# Patient Record
Sex: FEMALE | Race: WHITE | NOT HISPANIC OR LATINO | Employment: UNEMPLOYED | URBAN - NONMETROPOLITAN AREA
[De-identification: names, ages, dates, MRNs, and addresses within clinical notes are randomized per-mention and may not be internally consistent; named-entity substitution may affect disease eponyms.]

---

## 2022-02-02 ENCOUNTER — OFFICE VISIT (OUTPATIENT)
Dept: FAMILY MEDICINE CLINIC | Facility: CLINIC | Age: 22
End: 2022-02-02

## 2022-02-02 VITALS
HEIGHT: 62 IN | SYSTOLIC BLOOD PRESSURE: 118 MMHG | HEART RATE: 82 BPM | OXYGEN SATURATION: 98 % | DIASTOLIC BLOOD PRESSURE: 82 MMHG | BODY MASS INDEX: 21.16 KG/M2 | TEMPERATURE: 98.2 F | RESPIRATION RATE: 18 BRPM | WEIGHT: 115 LBS

## 2022-02-02 DIAGNOSIS — R53.83 FATIGUE, UNSPECIFIED TYPE: ICD-10-CM

## 2022-02-02 DIAGNOSIS — N94.6 DYSMENORRHEA: ICD-10-CM

## 2022-02-02 DIAGNOSIS — R19.7 DIARRHEA, UNSPECIFIED TYPE: ICD-10-CM

## 2022-02-02 DIAGNOSIS — Z76.89 ENCOUNTER TO ESTABLISH CARE: ICD-10-CM

## 2022-02-02 DIAGNOSIS — Z00.00 ROUTINE MEDICAL EXAM: Primary | ICD-10-CM

## 2022-02-02 DIAGNOSIS — Z00.00 ROUTINE MEDICAL EXAM: ICD-10-CM

## 2022-02-02 LAB
ALBUMIN SERPL-MCNC: 4.52 G/DL (ref 3.5–5.2)
ALBUMIN/GLOB SERPL: 1.5 G/DL
ALP SERPL-CCNC: 58 U/L (ref 39–117)
ALT SERPL W P-5'-P-CCNC: 11 U/L (ref 1–33)
ANION GAP SERPL CALCULATED.3IONS-SCNC: 11.3 MMOL/L (ref 5–15)
AST SERPL-CCNC: 19 U/L (ref 1–32)
BASOPHILS # BLD AUTO: 0.02 10*3/MM3 (ref 0–0.2)
BASOPHILS NFR BLD AUTO: 0.5 % (ref 0–1.5)
BILIRUB SERPL-MCNC: 0.3 MG/DL (ref 0–1.2)
BUN SERPL-MCNC: 6 MG/DL (ref 6–20)
BUN/CREAT SERPL: 8.8 (ref 7–25)
CALCIUM SPEC-SCNC: 9.6 MG/DL (ref 8.6–10.5)
CHLORIDE SERPL-SCNC: 103 MMOL/L (ref 98–107)
CHOLEST SERPL-MCNC: 139 MG/DL (ref 0–200)
CO2 SERPL-SCNC: 25.7 MMOL/L (ref 22–29)
CREAT SERPL-MCNC: 0.68 MG/DL (ref 0.57–1)
DEPRECATED RDW RBC AUTO: 46.3 FL (ref 37–54)
EOSINOPHIL # BLD AUTO: 0 10*3/MM3 (ref 0–0.4)
EOSINOPHIL NFR BLD AUTO: 0 % (ref 0.3–6.2)
ERYTHROCYTE [DISTWIDTH] IN BLOOD BY AUTOMATED COUNT: 12.9 % (ref 12.3–15.4)
GFR SERPL CREATININE-BSD FRML MDRD: 110 ML/MIN/1.73
GLOBULIN UR ELPH-MCNC: 3.1 GM/DL
GLUCOSE SERPL-MCNC: 82 MG/DL (ref 65–99)
HCT VFR BLD AUTO: 35.6 % (ref 34–46.6)
HCV AB SER DONR QL: NORMAL
HDLC SERPL-MCNC: 59 MG/DL (ref 40–60)
HGB BLD-MCNC: 11.9 G/DL (ref 12–15.9)
IMM GRANULOCYTES # BLD AUTO: 0.01 10*3/MM3 (ref 0–0.05)
IMM GRANULOCYTES NFR BLD AUTO: 0.2 % (ref 0–0.5)
LDLC SERPL CALC-MCNC: 63 MG/DL (ref 0–100)
LDLC/HDLC SERPL: 1.05 {RATIO}
LYMPHOCYTES # BLD AUTO: 1.52 10*3/MM3 (ref 0.7–3.1)
LYMPHOCYTES NFR BLD AUTO: 37.3 % (ref 19.6–45.3)
MCH RBC QN AUTO: 32.4 PG (ref 26.6–33)
MCHC RBC AUTO-ENTMCNC: 33.4 G/DL (ref 31.5–35.7)
MCV RBC AUTO: 97 FL (ref 79–97)
MONOCYTES # BLD AUTO: 0.7 10*3/MM3 (ref 0.1–0.9)
MONOCYTES NFR BLD AUTO: 17.2 % (ref 5–12)
NEUTROPHILS NFR BLD AUTO: 1.82 10*3/MM3 (ref 1.7–7)
NEUTROPHILS NFR BLD AUTO: 44.8 % (ref 42.7–76)
NRBC BLD AUTO-RTO: 0 /100 WBC (ref 0–0.2)
PLATELET # BLD AUTO: 186 10*3/MM3 (ref 140–450)
PMV BLD AUTO: 10.9 FL (ref 6–12)
POTASSIUM SERPL-SCNC: 3.8 MMOL/L (ref 3.5–5.2)
PROT SERPL-MCNC: 7.6 G/DL (ref 6–8.5)
RBC # BLD AUTO: 3.67 10*6/MM3 (ref 3.77–5.28)
SODIUM SERPL-SCNC: 140 MMOL/L (ref 136–145)
TRIGL SERPL-MCNC: 90 MG/DL (ref 0–150)
TSH SERPL DL<=0.05 MIU/L-ACNC: 0.74 UIU/ML (ref 0.27–4.2)
VLDLC SERPL-MCNC: 17 MG/DL (ref 5–40)
WBC NRBC COR # BLD: 4.07 10*3/MM3 (ref 3.4–10.8)

## 2022-02-02 PROCEDURE — 99203 OFFICE O/P NEW LOW 30 MIN: CPT | Performed by: NURSE PRACTITIONER

## 2022-02-02 PROCEDURE — 80061 LIPID PANEL: CPT | Performed by: NURSE PRACTITIONER

## 2022-02-02 PROCEDURE — 86803 HEPATITIS C AB TEST: CPT | Performed by: NURSE PRACTITIONER

## 2022-02-02 PROCEDURE — 82306 VITAMIN D 25 HYDROXY: CPT | Performed by: NURSE PRACTITIONER

## 2022-02-02 PROCEDURE — 82607 VITAMIN B-12: CPT | Performed by: NURSE PRACTITIONER

## 2022-02-02 PROCEDURE — 80050 GENERAL HEALTH PANEL: CPT | Performed by: NURSE PRACTITIONER

## 2022-02-02 RX ORDER — HYDROCODONE BITARTRATE AND ACETAMINOPHEN 5; 325 MG/1; MG/1
1 TABLET ORAL EVERY 6 HOURS PRN
COMMUNITY
End: 2022-06-30

## 2022-02-02 RX ORDER — AMOXICILLIN 500 MG/1
500 CAPSULE ORAL 3 TIMES DAILY
COMMUNITY
End: 2022-06-30

## 2022-02-02 NOTE — PATIENT INSTRUCTIONS
Dysmenorrhea    Dysmenorrhea refers to cramps caused by the muscles of the uterus tightening (bala) during a menstrual period. Dysmenorrhea may be mild, or it may be severe enough to interfere with everyday activities for a few days each month.  Primary dysmenorrhea is menstrual cramps that last a couple of days when you start having menstrual periods or soon after. This often begins after a teenager starts having her period. As a woman gets older or has a baby, the cramps will usually lessen or disappear. Secondary dysmenorrhea begins later in life and is caused by a disorder in the reproductive system. It lasts longer, and it may cause more pain than primary dysmenorrhea. The pain may start before the period and last a few days after the period.  What are the causes?  Dysmenorrhea is usually caused by an underlying problem, such as:  · The tissue that lines the uterus (endometrium) growing outside of the uterus in other areas of the body (endometriosis).  · Endometrial tissue growing into the muscular walls of the uterus (adenomyosis).  · Blood vessels in the pelvis becoming filled with blood just before the menstrual period (pelvic congestive syndrome).  · Overgrowth of cells (polyps) in the endometrium or the lower part of the uterus (cervix).  · The uterus dropping down into the vagina (prolapse) due to stretched or weak muscles.  · Bladder problems, such as infection or inflammation.  · Intestinal problems, such as a tumor or irritable bowel syndrome.  · Cancer of the reproductive organs or bladder.  · A severely tipped uterus.  · A cervix that is closed or has a very small opening.  · Noncancerous (benign) tumors of the uterus (fibroids).  · Pelvic inflammatory disease (PID).  · Pelvic scarring (adhesions) from a previous surgery.  · An ovarian cyst.  · An IUD (intrauterine device).  What increases the risk?  You are more likely to develop this condition if:  · You are younger than age 30.  · You  started puberty early.  · You have irregular or heavy bleeding.  · You have never given birth.  · You have a family history of dysmenorrhea.  · You smoke.  What are the signs or symptoms?  Symptoms of this condition include:  · Cramping, throbbing pain, or a feeling of fullness in the lower abdomen.  · Lower back pain.  · Periods lasting for longer than 7 days.  · Headaches.  · Bloating.  · Fatigue.  · Nausea or vomiting.  · Diarrhea.  · Sweating or dizziness.  · Loose stools.  How is this diagnosed?  This condition may be diagnosed based on:  · Your symptoms.  · Your medical history.  · A physical exam.  · Blood tests.  · A Pap test. This is a test in which cells from the cervix are tested for signs of cancer or infection.  · A pregnancy test.  · Imaging tests, such as:  ? Ultrasound.  ? A procedure to remove and examine a sample of endometrial tissue (dilation and curettage, D&C).  ? A procedure to visually examine the inside of:  § The uterus (hysteroscopy).  § The abdomen or pelvis (laparoscopy).  § The bladder (cystoscopy).  § The intestine (colonoscopy).  § The stomach (gastroscopy).  ? X-rays.  ? CT scan.  ? MRI.  How is this treated?  Treatment depends on the cause of the dysmenorrhea. Treatment may include:  · Pain medicine prescribed by your health care provider.  · Birth control pills that contain the hormone progesterone.  · An IUD that contains the hormone progesterone.  · Medicines to control bleeding.  · Hormone replacement therapy.  · NSAIDs. These may help to stop the production of hormones that cause cramps.  · Antidepressant medicines.  · Surgery to remove adhesions, endometriosis, ovarian cysts, fibroids, or the entire uterus (hysterectomy).  · Injections of progesterone to stop the menstrual period.  · A procedure to destroy the endometrium (endometrial ablation).  · A procedure to cut the nerves in the bottom of the spine (sacrum) that go to the reproductive organs (presacral neurectomy).  · A  procedure to apply an electric current to nerves in the sacrum (sacral nerve stimulation).  · Exercise and physical therapy.  · Meditation and yoga therapy.  · Acupuncture.  Work with your health care provider to determine what treatment or combination of treatments is best for you.  Follow these instructions at home:  Relieving pain and cramping  · Apply heat to your lower back or abdomen when you experience pain or cramps. Use the heat source that your health care provider recommends, such as a moist heat pack or a heating pad.  ? Place a towel between your skin and the heat source.  ? Leave the heat on for 20-30 minutes.  ? Remove the heat if your skin turns bright red. This is especially important if you are unable to feel pain, heat, or cold. You may have a greater risk of getting burned.  ? Do not sleep with a heating pad on.  · Do aerobic exercises, such as walking, swimming, or biking. This can help to relieve cramps.  · Massage your lower back or abdomen to help relieve pain.  General instructions  · Take over-the-counter and prescription medicines only as told by your health care provider.  · Do not drive or use heavy machinery while taking prescription pain medicine.  · Avoid alcohol and caffeine during and right before your menstrual period. These can make cramps worse.  · Do not use any products that contain nicotine or tobacco, such as cigarettes and e-cigarettes. If you need help quitting, ask your health care provider.  · Keep all follow-up visits as told by your health care provider. This is important.  Contact a health care provider if:  · You have pain that gets worse or does not get better with medicine.  · You have pain with sex.  · You develop nausea or vomiting with your period that is not controlled with medicine.  Get help right away if:  · You faint.  Summary  · Dysmenorrhea refers to cramps caused by the muscles of the uterus tightening (bala) during a menstrual  period.  · Dysmenorrhea may be mild, or it may be severe enough to interfere with everyday activities for a few days each month.  · Treatment depends on the cause of the dysmenorrhea.  · Work with your health care provider to determine what treatment or combination of treatments is best for you.  This information is not intended to replace advice given to you by your health care provider. Make sure you discuss any questions you have with your health care provider.  Document Revised: 11/30/2018 Document Reviewed: 01/20/2018  [employer] Patient Education © 2021 [employer]    Fatigue  If you have fatigue, you feel tired all the time and have a lack of energy or a lack of motivation. Fatigue may make it difficult to start or complete tasks because of exhaustion. In general, occasional or mild fatigue is often a normal response to activity or life. However, long-lasting (chronic) or extreme fatigue may be a symptom of a medical condition.  Follow these instructions at home:  General instructions  · Watch your fatigue for any changes.  · Go to bed and get up at the same time every day.  · Avoid fatigue by pacing yourself during the day and getting enough sleep at night.  · Maintain a healthy weight.  Medicines  · Take over-the-counter and prescription medicines only as told by your health care provider.  · Take a multivitamin, if told by your health care provider.   · Do not use herbal or dietary supplements unless they are approved by your health care provider.  Activity    · Exercise regularly, as told by your health care provider.  · Use or practice techniques to help you relax, such as yoga, mary chi, meditation, or massage therapy.    Eating and drinking    · Avoid heavy meals in the evening.  · Eat a well-balanced diet, which includes lean proteins, whole grains, plenty of fruits and vegetables, and low-fat dairy products.  · Avoid consuming too much caffeine.  · Avoid the use of alcohol.  · Drink enough fluid to  keep your urine pale yellow.    Lifestyle  · Change situations that cause you stress. Try to keep your work and personal schedule in balance.  · Do not use any products that contain nicotine or tobacco, such as cigarettes and e-cigarettes. If you need help quitting, ask your health care provider.  · Do not use drugs.  Contact a health care provider if:  · Your fatigue does not get better.  · You have a fever.  · You suddenly lose or gain weight.  · You have headaches.  · You have trouble falling asleep or sleeping through the night.  · You feel angry, guilty, anxious, or sad.  · You are unable to have a bowel movement (constipation).  · Your skin is dry.  · You have swelling in your legs or another part of your body.  Get help right away if:  · You feel confused.  · Your vision is blurry.  · You feel faint or you pass out.  · You have a severe headache.  · You have severe pain in your abdomen, your back, or the area between your waist and hips (pelvis).  · You have chest pain, shortness of breath, or an irregular or fast heartbeat.  · You are unable to urinate, or you urinate less than normal.  · You have abnormal bleeding, such as bleeding from the rectum, vagina, nose, lungs, or nipples.  · You vomit blood.  · You have thoughts about hurting yourself or others.  If you ever feel like you may hurt yourself or others, or have thoughts about taking your own life, get help right away. You can go to your nearest emergency department or call:  · Your local emergency services (911 in the [place]  · A suicide crisis helpline, such as the National Suicide Prevention Lifeline at 1-911.129.4434. This is open 24 hours a day.  Summary  · If you have fatigue, you feel tired all the time and have a lack of energy or a lack of motivation.  · Fatigue may make it difficult to start or complete tasks because of exhaustion.  · Long-lasting (chronic) or extreme fatigue may be a symptom of a medical condition.  · Exercise regularly, as  told by your health care provider.  · Change situations that cause you stress. Try to keep your work and personal schedule in balance.  This information is not intended to replace advice given to you by your health care provider. Make sure you discuss any questions you have with your health care provider.  Document Revised: 07/08/2020 Document Reviewed: 09/12/2018  [employer] Patient Education © 2021 [employer]

## 2022-02-02 NOTE — PROGRESS NOTES
"Chief Complaint  Establish Care    Subjective          Chanda Eden presents to [facility] to establish care (annual physical).    Fatigue  This is a recurrent problem. Episode onset: approximately one year ago. The problem occurs daily. The problem has been gradually worsening. Associated symptoms include abdominal pain, a change in bowel habit, chills, fatigue, headaches, nausea and swollen glands. Pertinent negatives include no anorexia, arthralgias, chest pain, congestion, coughing, diaphoresis, fever, joint swelling, myalgias, neck pain, numbness, rash, sore throat, urinary symptoms, vertigo, visual change, vomiting or weakness. Nothing aggravates the symptoms. Treatments tried: caffeine. The treatment provided mild relief.   Diarrhea   This is a recurrent problem. Episode onset: approximately 10 months ago. The problem occurs 2 to 4 times per day. The problem has been unchanged. The stool consistency is described as watery. The patient states that diarrhea does not awaken her from sleep. Associated symptoms include abdominal pain, bloating, chills and headaches. Pertinent negatives include no arthralgias, coughing, fever, increased  flatus, myalgias, sweats, URI, vomiting or weight loss. Exacerbated by: \"chicken\" per pt. She has tried increased fluids for the symptoms.     Objective   Vital Signs:   /82   Pulse 82   Temp 98.2 °F (36.8 °C) (Temporal)   Resp 18   Ht 157.5 cm (62\")   Wt 52.2 kg (115 lb)   SpO2 98%   BMI 21.03 kg/m²     Physical Exam  Vitals and nursing note reviewed.   Constitutional:       General: She is awake.      Appearance: Normal appearance.   HENT:      Head: Normocephalic.      Right Ear: Hearing and external ear normal. There is impacted cerumen.      Left Ear: Hearing and external ear normal. There is impacted cerumen.      Nose: Nose normal.      Mouth/Throat:      Lips: Pink.      Mouth: Mucous membranes are moist.      Pharynx: " Pharyngeal swelling and posterior oropharyngeal erythema present.   Eyes:      General: Lids are normal.      Extraocular Movements: Extraocular movements intact.      Conjunctiva/sclera: Conjunctivae normal.      Pupils: Pupils are equal, round, and reactive to light.   Cardiovascular:      Rate and Rhythm: Normal rate and regular rhythm.      Pulses: Normal pulses.      Heart sounds: Normal heart sounds.   Pulmonary:      Effort: Pulmonary effort is normal.      Breath sounds: Normal breath sounds.   Abdominal:      General: Abdomen is flat. Bowel sounds are normal.      Palpations: Abdomen is soft.      Tenderness: There is no abdominal tenderness.   Musculoskeletal:         General: Normal range of motion.      Cervical back: Normal range of motion and neck supple.      Right lower leg: No edema.      Left lower leg: No edema.   Lymphadenopathy:      Head:      Right side of head: Preauricular adenopathy present.      Cervical: Cervical adenopathy present.      Right cervical: Superficial cervical adenopathy present.      Left cervical: Superficial cervical adenopathy present.   Skin:     General: Skin is warm and dry.      Capillary Refill: Capillary refill takes less than 2 seconds.   Neurological:      Mental Status: She is alert and oriented to person, place, and time.      Cranial Nerves: Cranial nerves are intact.      Sensory: Sensation is intact.      Motor: Motor function is intact.      Coordination: Coordination is intact.      Gait: Gait is intact.   Psychiatric:         Attention and Perception: Attention normal.         Mood and Affect: Mood and affect normal.         Speech: Speech normal.         Behavior: Behavior normal. Behavior is cooperative.         Thought Content: Thought content normal.         Cognition and Memory: Cognition normal.         Judgment: Judgment normal.        Result Review :   The following data was reviewed by: KAYLAH Delgado on 02/02/2022:              Assessment  and Plan    Diagnoses and all orders for this visit:    1. Routine medical exam (Primary)  -     CBC w AUTO Differential; Future  -     Comprehensive metabolic panel; Future  -     Lipid panel; Future  -     TSH; Future  -     Hepatitis C antibody; Future    2. Fatigue, unspecified type  -     TSH; Future  -     Vitamin D 25 hydroxy; Future  -     Vitamin B12; Future    3. Dysmenorrhea  -     CBC w AUTO Differential; Future  -     Ambulatory Referral to Obstetrics / Gynecology    4. Diarrhea, unspecified type  -     Ambulatory Referral to Gastroenterology    5. Encounter to establish care    I spent 30 minutes caring for Chanda on this date of service. This time includes time spent by me in the following activities:preparing for the visit, reviewing tests, obtaining and/or reviewing a separately obtained history, performing a medically appropriate examination and/or evaluation , counseling and educating the patient/family/caregiver, ordering medications, tests, or procedures, referring and communicating with other health care professionals , documenting information in the medical record, independently interpreting results and communicating that information with the patient/family/caregiver and care coordination  Follow Up   Return in about 6 months (around 8/2/2022).  Patient was given instructions and counseling regarding her condition or for health maintenance advice. Please see specific information pulled into the AVS if appropriate.       This document has been electronically signed by KAYLAH Delgado  February 2, 2022 12:44 EST

## 2022-02-03 ENCOUNTER — OFFICE VISIT (OUTPATIENT)
Dept: FAMILY MEDICINE CLINIC | Facility: CLINIC | Age: 22
End: 2022-02-03

## 2022-02-03 ENCOUNTER — TELEPHONE (OUTPATIENT)
Dept: PEDIATRICS | Facility: OTHER | Age: 22
End: 2022-02-03

## 2022-02-03 VITALS
SYSTOLIC BLOOD PRESSURE: 116 MMHG | BODY MASS INDEX: 21.16 KG/M2 | HEART RATE: 87 BPM | WEIGHT: 115 LBS | OXYGEN SATURATION: 99 % | RESPIRATION RATE: 18 BRPM | HEIGHT: 62 IN | DIASTOLIC BLOOD PRESSURE: 71 MMHG | TEMPERATURE: 97.6 F

## 2022-02-03 DIAGNOSIS — H60.502 ACUTE OTITIS EXTERNA OF LEFT EAR, UNSPECIFIED TYPE: Primary | ICD-10-CM

## 2022-02-03 DIAGNOSIS — E55.9 VITAMIN D DEFICIENCY: ICD-10-CM

## 2022-02-03 LAB
25(OH)D3 SERPL-MCNC: 15.1 NG/ML (ref 30–100)
VIT B12 BLD-MCNC: 764 PG/ML (ref 211–946)

## 2022-02-03 PROCEDURE — 99213 OFFICE O/P EST LOW 20 MIN: CPT | Performed by: NURSE PRACTITIONER

## 2022-02-03 RX ORDER — ERGOCALCIFEROL 1.25 MG/1
50000 CAPSULE ORAL WEEKLY
Qty: 8 CAPSULE | Refills: 0 | Status: SHIPPED | OUTPATIENT
Start: 2022-02-03 | End: 2022-08-26

## 2022-02-03 RX ORDER — OFLOXACIN 3 MG/ML
5 SOLUTION AURICULAR (OTIC) DAILY
Qty: 5 ML | Refills: 0 | Status: SHIPPED | OUTPATIENT
Start: 2022-02-03 | End: 2022-06-30

## 2022-02-03 NOTE — PROGRESS NOTES
"Chief Complaint  Ear Fullness (on the left side) and Potential Dry Socket.    Subjective          Chanda Eden presents to [facility] for acute care ear congestion (left).    Ear Fullness   There is pain in the left ear. This is a new problem. The current episode started yesterday (Pt had cerumen removal via irrigation yesterday). The problem occurs constantly. The problem has been unchanged. There has been no fever. The patient is experiencing no pain. Pertinent negatives include no abdominal pain, coughing, diarrhea, ear discharge, headaches, hearing loss, neck pain, rash, rhinorrhea, sore throat or vomiting. She has tried nothing for the symptoms.     Objective   Vital Signs:   /71   Pulse 87   Temp 97.6 °F (36.4 °C) (Temporal)   Resp 18   Ht 157.5 cm (62\")   Wt 52.2 kg (115 lb)   SpO2 99%   BMI 21.03 kg/m²     Physical Exam  Vitals and nursing note reviewed.   Constitutional:       General: She is awake.      Appearance: Normal appearance.   HENT:      Head: Normocephalic.      Right Ear: Hearing, tympanic membrane, ear canal and external ear normal.      Left Ear: Hearing, tympanic membrane and external ear normal. Swelling and tenderness present.      Nose: Nose normal.      Mouth/Throat:      Lips: Pink.      Mouth: Mucous membranes are moist.      Pharynx: Oropharynx is clear.   Eyes:      General: Lids are normal.      Conjunctiva/sclera: Conjunctivae normal.      Pupils: Pupils are equal, round, and reactive to light.   Cardiovascular:      Rate and Rhythm: Normal rate and regular rhythm.      Heart sounds: Normal heart sounds.   Pulmonary:      Effort: Pulmonary effort is normal.      Breath sounds: Normal breath sounds.   Abdominal:      General: Abdomen is protuberant. Bowel sounds are normal.      Palpations: Abdomen is soft.      Tenderness: There is no abdominal tenderness.   Musculoskeletal:         General: Normal range of motion.      Cervical back: " Normal range of motion.   Skin:     General: Skin is warm and dry.      Capillary Refill: Capillary refill takes less than 2 seconds.   Neurological:      Mental Status: She is alert and oriented to person, place, and time.      Sensory: Sensation is intact.      Motor: Motor function is intact.      Coordination: Coordination is intact.      Gait: Gait is intact.   Psychiatric:         Attention and Perception: Attention normal.         Mood and Affect: Mood and affect normal.         Speech: Speech normal.         Behavior: Behavior normal. Behavior is cooperative.         Thought Content: Thought content normal.         Cognition and Memory: Cognition normal.         Judgment: Judgment normal.        Result Review :   The following data was reviewed by: KAYLAH Delgado on 02/03/2022:  CMP    CMP 2/2/22   Glucose 82   BUN 6   Creatinine 0.68   eGFR Non African Am 110   Sodium 140   Potassium 3.8   Chloride 103   Calcium 9.6   Albumin 4.52   Total Bilirubin 0.3   Alkaline Phosphatase 58   AST (SGOT) 19   ALT (SGPT) 11           CBC w/diff    CBC w/Diff 2/2/22   WBC 4.07   RBC 3.67 (A)   Hemoglobin 11.9 (A)   Hematocrit 35.6   MCV 97.0   MCH 32.4   MCHC 33.4   RDW 12.9   Platelets 186   Neutrophil Rel % 44.8   Immature Granulocyte Rel % 0.2   Lymphocyte Rel % 37.3   Monocyte Rel % 17.2 (A)   Eosinophil Rel % 0.0 (A)   Basophil Rel % 0.5   (A) Abnormal value            Lipid Panel    Lipid Panel 2/2/22   Total Cholesterol 139   Triglycerides 90   HDL Cholesterol 59   VLDL Cholesterol 17   LDL Cholesterol  63   LDL/HDL Ratio 1.05           TSH    TSH 2/2/22   TSH 0.741           Assessment and Plan    Diagnoses and all orders for this visit:    1. Acute otitis externa of left ear, unspecified type (Primary)  -     ofloxacin (FLOXIN) 0.3 % otic solution; Administer 5 drops into the left ear Daily.  Dispense: 5 mL; Refill: 0    2. Vitamin D deficiency  -     vitamin D (ERGOCALCIFEROL) 1.25 MG (29144 UT) capsule  capsule; Take 1 capsule by mouth 1 (One) Time Per Week.  Dispense: 8 capsule; Refill: 0    I spent 20 minutes caring for Chanda on this date of service. This time includes time spent by me in the following activities:preparing for the visit, reviewing tests, obtaining and/or reviewing a separately obtained history, performing a medically appropriate examination and/or evaluation , counseling and educating the patient/family/caregiver, ordering medications, tests, or procedures, documenting information in the medical record and independently interpreting results and communicating that information with the patient/family/caregiver  Follow Up   Return if symptoms worsen or fail to improve.  Patient was given instructions and counseling regarding her condition or for health maintenance advice. Please see specific information pulled into the AVS if appropriate.       This document has been electronically signed by KAYLAH Delgado  February 3, 2022 13:53 EST

## 2022-02-03 NOTE — PATIENT INSTRUCTIONS
Otitis Externa    Otitis externa is an infection of the outer ear canal. The outer ear canal is the area between the outside of the ear and the eardrum. Otitis externa is sometimes called swimmer's ear.  What are the causes?  Common causes of this condition include:  · Swimming in dirty water.  · Moisture in the ear.  · An injury to the inside of the ear.  · An object stuck in the ear.  · A cut or scrape on the outside of the ear.  What increases the risk?  You are more likely to develop this condition if you go swimming often.  What are the signs or symptoms?  The first symptom of this condition is often itching in the ear. Later symptoms of the condition include:  · Swelling of the ear.  · Redness in the ear.  · Ear pain. The pain may get worse when you pull on your ear.  · Pus coming from the ear.  How is this diagnosed?  This condition may be diagnosed by examining the ear and testing fluid from the ear for bacteria and funguses.  How is this treated?  This condition may be treated with:  · Antibiotic ear drops. These are often given for 10-14 days.  · Medicines to reduce itching and swelling.  Follow these instructions at home:  · If you were prescribed antibiotic ear drops, use them as told by your health care provider. Do not stop using the antibiotic even if your condition improves.  · Take over-the-counter and prescription medicines only as told by your health care provider.  · Avoid getting water in your ears as told by your health care provider. This may include avoiding swimming or water sports for a few days.  · Keep all follow-up visits as told by your health care provider. This is important.  How is this prevented?  · Keep your ears dry. Use the corner of a towel to dry your ears after you swim or bathe.  · Avoid scratching or putting things in your ear. Doing these things can damage the ear canal or remove the protective wax that lines it, which makes it easier for bacteria and funguses to  grow.  · Avoid swimming in lakes, polluted water, or pools that may not have enough chlorine.  Contact a health care provider if:  · You have a fever.  · Your ear is still red, swollen, painful, or draining pus after 3 days.  · Your redness, swelling, or pain gets worse.  · You have a severe headache.  · You have redness, swelling, pain, or tenderness in the area behind your ear.  Summary  · Otitis externa is an infection of the outer ear canal.  · Common causes include swimming in dirty water, moisture in the ear, or a cut or scrape in the ear.  · Symptoms include pain, redness, and swelling of the ear.  · If you were prescribed antibiotic ear drops, use them as told by your health care provider. Do not stop using the antibiotic even if your condition improves.  This information is not intended to replace advice given to you by your health care provider. Make sure you discuss any questions you have with your health care provider.  Document Revised: 05/24/2019 Document Reviewed: 05/24/2019  [employer] Patient Education © 2021 [employer]

## 2022-06-30 ENCOUNTER — OFFICE VISIT (OUTPATIENT)
Dept: FAMILY MEDICINE CLINIC | Facility: CLINIC | Age: 22
End: 2022-06-30

## 2022-06-30 VITALS
RESPIRATION RATE: 18 BRPM | HEIGHT: 62 IN | SYSTOLIC BLOOD PRESSURE: 121 MMHG | BODY MASS INDEX: 20.02 KG/M2 | WEIGHT: 108.8 LBS | HEART RATE: 88 BPM | OXYGEN SATURATION: 99 % | TEMPERATURE: 98.2 F | DIASTOLIC BLOOD PRESSURE: 81 MMHG

## 2022-06-30 DIAGNOSIS — E55.9 VITAMIN D DEFICIENCY: ICD-10-CM

## 2022-06-30 DIAGNOSIS — Z72.51 HIGH RISK SEXUAL BEHAVIOR, UNSPECIFIED TYPE: ICD-10-CM

## 2022-06-30 DIAGNOSIS — R53.83 FATIGUE, UNSPECIFIED TYPE: ICD-10-CM

## 2022-06-30 DIAGNOSIS — R53.83 FATIGUE, UNSPECIFIED TYPE: Primary | ICD-10-CM

## 2022-06-30 LAB
ALBUMIN SERPL-MCNC: 5.01 G/DL (ref 3.5–5.2)
ALBUMIN/GLOB SERPL: 1.7 G/DL
ALP SERPL-CCNC: 55 U/L (ref 39–117)
ALT SERPL W P-5'-P-CCNC: 7 U/L (ref 1–33)
ANION GAP SERPL CALCULATED.3IONS-SCNC: 11.8 MMOL/L (ref 5–15)
AST SERPL-CCNC: 15 U/L (ref 1–32)
BASOPHILS # BLD AUTO: 0.05 10*3/MM3 (ref 0–0.2)
BASOPHILS NFR BLD AUTO: 0.9 % (ref 0–1.5)
BILIRUB SERPL-MCNC: 0.6 MG/DL (ref 0–1.2)
BUN SERPL-MCNC: 14 MG/DL (ref 6–20)
BUN/CREAT SERPL: 17.3 (ref 7–25)
C TRACH RRNA CVX QL NAA+PROBE: NOT DETECTED
CALCIUM SPEC-SCNC: 9.7 MG/DL (ref 8.6–10.5)
CHLORIDE SERPL-SCNC: 104 MMOL/L (ref 98–107)
CO2 SERPL-SCNC: 26.2 MMOL/L (ref 22–29)
CREAT SERPL-MCNC: 0.81 MG/DL (ref 0.57–1)
DEPRECATED RDW RBC AUTO: 47.8 FL (ref 37–54)
EGFRCR SERPLBLD CKD-EPI 2021: 106.7 ML/MIN/1.73
EOSINOPHIL # BLD AUTO: 0.07 10*3/MM3 (ref 0–0.4)
EOSINOPHIL NFR BLD AUTO: 1.2 % (ref 0.3–6.2)
ERYTHROCYTE [DISTWIDTH] IN BLOOD BY AUTOMATED COUNT: 13.8 % (ref 12.3–15.4)
GLOBULIN UR ELPH-MCNC: 2.9 GM/DL
GLUCOSE SERPL-MCNC: 67 MG/DL (ref 65–99)
HCT VFR BLD AUTO: 39.7 % (ref 34–46.6)
HGB BLD-MCNC: 13.2 G/DL (ref 12–15.9)
IMM GRANULOCYTES # BLD AUTO: 0.02 10*3/MM3 (ref 0–0.05)
IMM GRANULOCYTES NFR BLD AUTO: 0.4 % (ref 0–0.5)
LYMPHOCYTES # BLD AUTO: 2.33 10*3/MM3 (ref 0.7–3.1)
LYMPHOCYTES NFR BLD AUTO: 41.4 % (ref 19.6–45.3)
MCH RBC QN AUTO: 31.4 PG (ref 26.6–33)
MCHC RBC AUTO-ENTMCNC: 33.2 G/DL (ref 31.5–35.7)
MCV RBC AUTO: 94.3 FL (ref 79–97)
MONOCYTES # BLD AUTO: 0.48 10*3/MM3 (ref 0.1–0.9)
MONOCYTES NFR BLD AUTO: 8.5 % (ref 5–12)
N GONORRHOEA RRNA SPEC QL NAA+PROBE: NOT DETECTED
NEUTROPHILS NFR BLD AUTO: 2.68 10*3/MM3 (ref 1.7–7)
NEUTROPHILS NFR BLD AUTO: 47.6 % (ref 42.7–76)
NRBC BLD AUTO-RTO: 0 /100 WBC (ref 0–0.2)
PLATELET # BLD AUTO: 224 10*3/MM3 (ref 140–450)
PMV BLD AUTO: 10.5 FL (ref 6–12)
POTASSIUM SERPL-SCNC: 4 MMOL/L (ref 3.5–5.2)
PROT SERPL-MCNC: 7.9 G/DL (ref 6–8.5)
RBC # BLD AUTO: 4.21 10*6/MM3 (ref 3.77–5.28)
SODIUM SERPL-SCNC: 142 MMOL/L (ref 136–145)
TRICHOMONAS VAGINALIS PCR: NOT DETECTED
TSH SERPL DL<=0.05 MIU/L-ACNC: 0.43 UIU/ML (ref 0.27–4.2)
WBC NRBC COR # BLD: 5.63 10*3/MM3 (ref 3.4–10.8)

## 2022-06-30 PROCEDURE — 87661 TRICHOMONAS VAGINALIS AMPLIF: CPT | Performed by: NURSE PRACTITIONER

## 2022-06-30 PROCEDURE — 84403 ASSAY OF TOTAL TESTOSTERONE: CPT | Performed by: NURSE PRACTITIONER

## 2022-06-30 PROCEDURE — 99214 OFFICE O/P EST MOD 30 MIN: CPT | Performed by: NURSE PRACTITIONER

## 2022-06-30 PROCEDURE — 80050 GENERAL HEALTH PANEL: CPT | Performed by: NURSE PRACTITIONER

## 2022-06-30 PROCEDURE — 82306 VITAMIN D 25 HYDROXY: CPT | Performed by: NURSE PRACTITIONER

## 2022-06-30 PROCEDURE — 87591 N.GONORRHOEAE DNA AMP PROB: CPT | Performed by: NURSE PRACTITIONER

## 2022-06-30 PROCEDURE — 87491 CHLMYD TRACH DNA AMP PROBE: CPT | Performed by: NURSE PRACTITIONER

## 2022-06-30 RX ORDER — PSEUDOEPHED/ACETAMINOPH/DIPHEN 30MG-500MG
TABLET ORAL
COMMUNITY
Start: 2022-04-18 | End: 2022-08-26

## 2022-06-30 NOTE — PROGRESS NOTES
"Chief Complaint  Follow-up (Check testosterone levels and still having increased fatigue)    Subjective        Chanda Eden presents to [facility] for follow up (fatigue/hormonal concerns).    Fatigue  This is a recurrent problem. Episode onset: \"years, but has worsened since stopped taking stimulants 5-6 days ago.\" The problem occurs daily. The problem has been gradually worsening. Associated symptoms include fatigue, headaches and weakness. Pertinent negatives include no abdominal pain, anorexia, arthralgias, change in bowel habit, chest pain, chills, congestion, coughing, diaphoresis, fever, joint swelling, myalgias, nausea, neck pain, numbness, rash, sore throat, swollen glands, urinary symptoms, vertigo, visual change or vomiting. Exacerbated by: stopped taking stimulants (pt states she was taking non-prescribed adarall and has tried amphetamines a few times.\" Treatments tried: caffeine.     Objective   Vital Signs:  /81 (BP Location: Right arm, Patient Position: Sitting, Cuff Size: Adult)   Pulse 88   Temp 98.2 °F (36.8 °C) (Temporal)   Resp 18   Ht 157.5 cm (62\")   Wt 49.4 kg (108 lb 12.8 oz)   SpO2 99%   BMI 19.90 kg/m²   Estimated body mass index is 19.9 kg/m² as calculated from the following:    Height as of this encounter: 157.5 cm (62\").    Weight as of this encounter: 49.4 kg (108 lb 12.8 oz).    BMI is within normal parameters. No other follow-up for BMI required.      Physical Exam  Vitals and nursing note reviewed.   Constitutional:       General: She is awake.      Appearance: Normal appearance.   HENT:      Head: Normocephalic.      Right Ear: Hearing and external ear normal.      Left Ear: Hearing and external ear normal.      Nose: Nose normal.      Mouth/Throat:      Lips: Pink.      Mouth: Mucous membranes are moist.   Eyes:      General: Lids are normal.      Conjunctiva/sclera: Conjunctivae normal.      Pupils: Pupils are equal, round, and " reactive to light.   Cardiovascular:      Rate and Rhythm: Normal rate and regular rhythm.      Pulses: Normal pulses.      Heart sounds: Normal heart sounds.   Pulmonary:      Effort: Pulmonary effort is normal.      Breath sounds: Normal breath sounds.   Abdominal:      General: Abdomen is flat. Bowel sounds are normal.      Palpations: Abdomen is soft.      Tenderness: There is no abdominal tenderness.   Musculoskeletal:         General: Normal range of motion.      Cervical back: Normal range of motion and neck supple.      Right lower leg: No edema.      Left lower leg: No edema.   Skin:     General: Skin is warm and dry.      Capillary Refill: Capillary refill takes less than 2 seconds.   Neurological:      Mental Status: She is alert and oriented to person, place, and time.      Sensory: Sensation is intact.      Motor: Motor function is intact.      Coordination: Coordination is intact.      Gait: Gait is intact.   Psychiatric:         Attention and Perception: Attention and perception normal.         Mood and Affect: Mood is anxious and depressed. Affect is flat.         Speech: Speech is rapid and pressured.         Behavior: Behavior is withdrawn. Behavior is cooperative.         Thought Content: Thought content normal.         Cognition and Memory: Cognition normal.        Result Review :  The following data was reviewed by: KAYLAH Delgado on 06/30/2022:  CMP    CMP 2/2/22   Glucose 82   BUN 6   Creatinine 0.68   eGFR Non African Am 110   Sodium 140   Potassium 3.8   Chloride 103   Calcium 9.6   Albumin 4.52   Total Bilirubin 0.3   Alkaline Phosphatase 58   AST (SGOT) 19   ALT (SGPT) 11           CBC w/diff    CBC w/Diff 2/2/22   WBC 4.07   RBC 3.67 (A)   Hemoglobin 11.9 (A)   Hematocrit 35.6   MCV 97.0   MCH 32.4   MCHC 33.4   RDW 12.9   Platelets 186   Neutrophil Rel % 44.8   Immature Granulocyte Rel % 0.2   Lymphocyte Rel % 37.3   Monocyte Rel % 17.2 (A)   Eosinophil Rel % 0.0 (A)   Basophil Rel  % 0.5   (A) Abnormal value            Lipid Panel    Lipid Panel 2/2/22   Total Cholesterol 139   Triglycerides 90   HDL Cholesterol 59   VLDL Cholesterol 17   LDL Cholesterol  63   LDL/HDL Ratio 1.05           TSH    TSH 2/2/22   TSH 0.741           Assessment and Plan   Diagnoses and all orders for this visit:    1. Fatigue, unspecified type (Primary)  -     Testosterone; Future  -     Vitamin D 25 hydroxy; Future  -     CBC w AUTO Differential; Future  -     TSH; Future  -     Comprehensive metabolic panel; Future    2. Vitamin D deficiency  -     Vitamin D 25 hydroxy; Future    3. High risk sexual behavior, unspecified type  -     Chlamydia trachomatis, Neisseria gonorrhoeae, Trichomonas vaginalis, PCR - Urine, Urine, Clean Catch; Future         I spent 20 minutes caring for Chanda on this date of service. This time includes time spent by me in the following activities:preparing for the visit, reviewing tests, obtaining and/or reviewing a separately obtained history, performing a medically appropriate examination and/or evaluation , counseling and educating the patient/family/caregiver, ordering medications, tests, or procedures, documenting information in the medical record and independently interpreting results and communicating that information with the patient/family/caregiver     Follow Up   Return if symptoms worsen or fail to improve.  Patient was given instructions and counseling regarding her condition or for health maintenance advice. Please see specific information pulled into the AVS if appropriate.       This document has been electronically signed by KAYLAH Delgado  June 30, 2022 17:09 EDT

## 2022-07-01 LAB
25(OH)D3 SERPL-MCNC: 28 NG/ML (ref 30–100)
TESTOST SERPL-MCNC: 24 NG/DL (ref 8.4–48.1)

## 2022-08-26 ENCOUNTER — OFFICE VISIT (OUTPATIENT)
Dept: FAMILY MEDICINE CLINIC | Facility: CLINIC | Age: 22
End: 2022-08-26

## 2022-08-26 VITALS
DIASTOLIC BLOOD PRESSURE: 68 MMHG | SYSTOLIC BLOOD PRESSURE: 112 MMHG | HEIGHT: 62 IN | OXYGEN SATURATION: 98 % | HEART RATE: 125 BPM | TEMPERATURE: 97.6 F | BODY MASS INDEX: 22.26 KG/M2 | WEIGHT: 121 LBS | RESPIRATION RATE: 18 BRPM

## 2022-08-26 DIAGNOSIS — R00.2 HEART PALPITATIONS: Primary | ICD-10-CM

## 2022-08-26 DIAGNOSIS — F41.9 ANXIETY: ICD-10-CM

## 2022-08-26 DIAGNOSIS — M79.89 LEG SWELLING: ICD-10-CM

## 2022-08-26 DIAGNOSIS — R63.5 WEIGHT GAIN: ICD-10-CM

## 2022-08-26 LAB
B-HCG UR QL: NEGATIVE
EXPIRATION DATE: NORMAL
INTERNAL NEGATIVE CONTROL: NORMAL
INTERNAL POSITIVE CONTROL: NORMAL
Lab: NORMAL

## 2022-08-26 PROCEDURE — 99214 OFFICE O/P EST MOD 30 MIN: CPT | Performed by: NURSE PRACTITIONER

## 2022-08-26 PROCEDURE — 81025 URINE PREGNANCY TEST: CPT | Performed by: NURSE PRACTITIONER

## 2022-08-26 PROCEDURE — 93000 ELECTROCARDIOGRAM COMPLETE: CPT | Performed by: NURSE PRACTITIONER

## 2022-08-26 RX ORDER — HYDROXYZINE HYDROCHLORIDE 10 MG/1
10 TABLET, FILM COATED ORAL 3 TIMES DAILY PRN
Qty: 90 TABLET | Refills: 0 | Status: SHIPPED | OUTPATIENT
Start: 2022-08-26 | End: 2022-11-04

## 2022-08-26 NOTE — PROGRESS NOTES
Chief Complaint  Foot Swelling (Bilateral feet swelling X couple months just gradually getting worse./) and Pregnancy Test    Subjective        Chanda Eden presents to [facility] for acute care (weight gain/leg swelling/palpitations/fatigue).    Leg Swelling  This is a new problem. Episode onset: a couple of months ago; worse over the last week and a half. The problem occurs daily. The problem has been gradually worsening. Associated symptoms include a change in bowel habit, fatigue and myalgias. Pertinent negatives include no abdominal pain, anorexia, arthralgias, chest pain, chills, congestion, coughing, diaphoresis, fever, headaches, joint swelling, nausea, neck pain, numbness, rash, sore throat, swollen glands, urinary symptoms, vertigo, visual change, vomiting or weakness. Associated symptoms comments: Bloated; constipated. Exacerbated by: stopped illicit drug use (methamphetamine) 8 days ago. Treatments tried: elevation; staying off of feet. The treatment provided mild relief.   Fatigue  This is a recurrent problem. Episode onset: worse since stopped illicit drug use 8 days ago. The problem occurs daily. The problem has been gradually worsening. Associated symptoms include a change in bowel habit, fatigue and myalgias. Pertinent negatives include no abdominal pain, anorexia, arthralgias, chest pain, chills, congestion, coughing, diaphoresis, fever, headaches, joint swelling, nausea, neck pain, numbness, rash, sore throat, swollen glands, urinary symptoms, vertigo, visual change, vomiting or weakness. Exacerbated by: stopped amphetamines 8 days ago. She has tried sleep and rest for the symptoms. The treatment provided no relief.   Palpitations   This is a recurrent problem. The problem occurs intermittently. The problem has been gradually improving. Exacerbated by: illicit drug use: methamphetamine. Associated symptoms include anxiety, an irregular heartbeat and  "malaise/fatigue. Pertinent negatives include no chest fullness, chest pain, coughing, diaphoresis, dizziness, fever, nausea, near-syncope, numbness, shortness of breath, syncope, vomiting or weakness. Treatments tried: relaxation; hydroxyzine for anxiety. The treatment provided mild relief. Risk factors include stress. Her past medical history is significant for anxiety and drug use.   Anxiety  Presents for follow-up visit. Symptoms include decreased concentration, depressed mood, excessive worry, insomnia, irritability, malaise, muscle tension, nervous/anxious behavior, obsessions, palpitations, panic and restlessness. Patient reports no chest pain, compulsions, confusion, dizziness, dry mouth, feeling of choking, hyperventilation, impotence, nausea, shortness of breath or suicidal ideas. Symptoms occur most days. The severity of symptoms is moderate. The quality of sleep is fair. Nighttime awakenings: occasional.     Her past medical history is significant for anxiety/panic attacks. Compliance with medications: pt states hydroxyzine helps with anxiety, but prescription was sent to pharmacy in [facility] and she hasn't been able to get it filled.     Objective   Vital Signs:  /68   Pulse (!) 125   Temp 97.6 °F (36.4 °C) (Temporal)   Resp 18   Ht 157.5 cm (62\")   Wt 54.9 kg (121 lb)   SpO2 98%   BMI 22.13 kg/m²   Estimated body mass index is 22.13 kg/m² as calculated from the following:    Height as of this encounter: 157.5 cm (62\").    Weight as of this encounter: 54.9 kg (121 lb).    BMI is within normal parameters. No other follow-up for BMI required.    Physical Exam  Vitals and nursing note reviewed.   Constitutional:       General: She is awake.      Appearance: Normal appearance.   HENT:      Head: Normocephalic.      Right Ear: Hearing and external ear normal.      Left Ear: Hearing and external ear normal.      Nose: Nose normal.      Mouth/Throat:      Lips: Pink.      Mouth: Mucous " membranes are moist.   Eyes:      General: Lids are normal.      Conjunctiva/sclera: Conjunctivae normal.      Pupils: Pupils are equal, round, and reactive to light.   Cardiovascular:      Rate and Rhythm: Regular rhythm. Tachycardia present.      Pulses: Normal pulses.      Heart sounds: Normal heart sounds.   Pulmonary:      Effort: Pulmonary effort is normal.      Breath sounds: Normal breath sounds.   Abdominal:      General: Bowel sounds are normal.   Musculoskeletal:         General: Normal range of motion.      Cervical back: Normal range of motion.      Right lower le+ Edema present.      Left lower le+ Edema present.   Skin:     General: Skin is warm and dry.      Capillary Refill: Capillary refill takes less than 2 seconds.   Neurological:      Mental Status: She is alert and oriented to person, place, and time.      Sensory: Sensation is intact.      Motor: Motor function is intact.      Coordination: Coordination is intact.      Gait: Gait is intact.   Psychiatric:         Attention and Perception: Attention and perception normal.         Mood and Affect: Mood is anxious. Affect is flat.         Speech: Speech is rapid and pressured.         Behavior: Behavior normal. Behavior is cooperative.         Thought Content: Thought content normal.        Result Review :  The following data was reviewed by: KAYLAH Delgado on 2022:  CMP    CMP 22   Glucose 82 67   BUN 6 14   Creatinine 0.68 0.81   eGFR Non African Am 110    Sodium 140 142   Potassium 3.8 4.0   Chloride 103 104   Calcium 9.6 9.7   Albumin 4.52 5.01   Total Bilirubin 0.3 0.6   Alkaline Phosphatase 58 55   AST (SGOT) 19 15   ALT (SGPT) 11 7      Comments are available for some flowsheets but are not being displayed.           CBC w/diff    CBC w/Diff 22   WBC 4.07 5.63   RBC 3.67 (A) 4.21   Hemoglobin 11.9 (A) 13.2   Hematocrit 35.6 39.7   MCV 97.0 94.3   MCH 32.4 31.4   MCHC 33.4 33.2   RDW 12.9 13.8    Platelets 186 224   Neutrophil Rel % 44.8 47.6   Immature Granulocyte Rel % 0.2 0.4   Lymphocyte Rel % 37.3 41.4   Monocyte Rel % 17.2 (A) 8.5   Eosinophil Rel % 0.0 (A) 1.2   Basophil Rel % 0.5 0.9   (A) Abnormal value            Lipid Panel    Lipid Panel 2/2/22   Total Cholesterol 139   Triglycerides 90   HDL Cholesterol 59   VLDL Cholesterol 17   LDL Cholesterol  63   LDL/HDL Ratio 1.05           TSH    TSH 2/2/22 6/30/22   TSH 0.741 0.429              ECG 12 Lead    Date/Time: 8/26/2022 1:16 PM  Performed by: Yessenia Hussein APRN  Authorized by: Yessenia Hussein APRN   Previous ECG: no previous ECG available  Rhythm: sinus tachycardia  Rate: tachycardic  BPM: 104  Conduction: conduction normal    Clinical impression: abnormal EKG              Assessment and Plan   Diagnoses and all orders for this visit:    1. Heart palpitations (Primary)  -     ECG 12 Lead  -     Ambulatory Referral to Cardiology    2. Leg swelling  Comments:  symptomatic/supportive care; HUMERA hose; limit sodium intake  Orders:  -     Ambulatory Referral to Cardiology    3. Anxiety  -     hydrOXYzine (ATARAX) 10 MG tablet; Take 1 tablet by mouth 3 (Three) Times a Day As Needed for Anxiety.  Dispense: 90 tablet; Refill: 0    4. Weight gain  -     POCT pregnancy, urine         I spent 30 minutes caring for Chanda on this date of service. This time includes time spent by me in the following activities:preparing for the visit, reviewing tests, obtaining and/or reviewing a separately obtained history, performing a medically appropriate examination and/or evaluation , counseling and educating the patient/family/caregiver, ordering medications, tests, or procedures, referring and communicating with other health care professionals , documenting information in the medical record, independently interpreting results and communicating that information with the patient/family/caregiver and care coordination     Follow Up   Return if symptoms worsen or fail  to improve.  Patient was given instructions and counseling regarding her condition or for health maintenance advice. Please see specific information pulled into the AVS if appropriate.       This document has been electronically signed by KAYLAH Delgado  August 26, 2022 13:40 EDT

## 2022-10-26 ENCOUNTER — TELEPHONE (OUTPATIENT)
Dept: CARDIOLOGY | Facility: CLINIC | Age: 22
End: 2022-10-26

## 2022-10-26 NOTE — TELEPHONE ENCOUNTER
For the HUB to read to pt:      Left voicemail to confirm appt, please put patient through if she calls back. Thank you

## 2022-11-03 ENCOUNTER — OFFICE VISIT (OUTPATIENT)
Dept: FAMILY MEDICINE CLINIC | Facility: CLINIC | Age: 22
End: 2022-11-03

## 2022-11-03 VITALS
RESPIRATION RATE: 18 BRPM | TEMPERATURE: 97.6 F | BODY MASS INDEX: 22.26 KG/M2 | HEART RATE: 124 BPM | HEIGHT: 62 IN | DIASTOLIC BLOOD PRESSURE: 58 MMHG | SYSTOLIC BLOOD PRESSURE: 82 MMHG | WEIGHT: 121 LBS | OXYGEN SATURATION: 99 %

## 2022-11-03 DIAGNOSIS — R05.9 COUGH, UNSPECIFIED TYPE: ICD-10-CM

## 2022-11-03 DIAGNOSIS — B37.31 VAGINAL YEAST INFECTION: Primary | ICD-10-CM

## 2022-11-03 DIAGNOSIS — B89 PARASITE INFECTION: ICD-10-CM

## 2022-11-03 PROCEDURE — 99213 OFFICE O/P EST LOW 20 MIN: CPT | Performed by: NURSE PRACTITIONER

## 2022-11-03 RX ORDER — FLUCONAZOLE 150 MG/1
150 TABLET ORAL ONCE
Qty: 2 TABLET | Refills: 0 | Status: SHIPPED | OUTPATIENT
Start: 2022-11-03 | End: 2022-11-03

## 2022-11-03 NOTE — PROGRESS NOTES
"Chief Complaint  Tinea (Worms - in bowel movement:/), Vaginal Discharge (/), cough, and Shortness of Breath (/)    Subjective        Chanda Eden presents to [facility] for acute care (vaginal discharge, cough, worms in stool).    Vaginal Discharge  The patient's primary symptoms include genital itching, vaginal bleeding and vaginal discharge. The patient's pertinent negatives include no genital lesions, genital odor, genital rash, missed menses or pelvic pain. This is a new problem. Episode onset: 5 days ago. The problem occurs daily. The problem has been gradually worsening. The pain is mild. She is not pregnant. Associated symptoms include abdominal pain, constipation, discolored urine, dysuria and painful intercourse. Pertinent negatives include no anorexia, back pain, chills, diarrhea, fever, flank pain, frequency, headaches, hematuria, joint pain, joint swelling, nausea, rash, sore throat, urgency or vomiting. The vaginal discharge was white and thick. Treatments tried: doxycycline; bactrim. She is not sexually active.   Cough  This is a new problem. The current episode started in the past 7 days. The problem has been waxing and waning. The problem occurs every few minutes. The cough is non-productive. Associated symptoms include rhinorrhea and shortness of breath. Pertinent negatives include no chest pain, chills, ear congestion, ear pain, fever, headaches, heartburn, hemoptysis, myalgias, nasal congestion, postnasal drip, rash, sore throat, sweats, weight loss or wheezing. The symptoms are aggravated by pollens. Risk factors for lung disease include smoking/tobacco exposure. She has tried nothing for the symptoms. Her past medical history is significant for environmental allergies.     Objective   Vital Signs:  BP (!) 82/58   Pulse (!) 124   Temp 97.6 °F (36.4 °C) (Temporal)   Resp 18   Ht 157.5 cm (62\")   Wt 54.9 kg (121 lb)   SpO2 99%   BMI 22.13 kg/m²   Estimated " "body mass index is 22.13 kg/m² as calculated from the following:    Height as of this encounter: 157.5 cm (62\").    Weight as of this encounter: 54.9 kg (121 lb).    BMI is within normal parameters. No other follow-up for BMI required.      Physical Exam  Vitals and nursing note reviewed.   Constitutional:       General: She is awake.      Appearance: Normal appearance.   HENT:      Head: Normocephalic.      Right Ear: Hearing and external ear normal.      Left Ear: Hearing and external ear normal.      Nose: Nose normal.      Mouth/Throat:      Lips: Pink.      Mouth: Mucous membranes are moist.      Pharynx: Oropharynx is clear.   Eyes:      General: Lids are normal.      Conjunctiva/sclera: Conjunctivae normal.      Pupils: Pupils are equal, round, and reactive to light.   Cardiovascular:      Rate and Rhythm: Normal rate and regular rhythm.      Heart sounds: Normal heart sounds.   Pulmonary:      Effort: Pulmonary effort is normal.      Breath sounds: Normal breath sounds.   Abdominal:      General: Abdomen is protuberant. Bowel sounds are normal.      Palpations: Abdomen is soft.      Tenderness: There is no abdominal tenderness.   Musculoskeletal:         General: Normal range of motion.      Cervical back: Normal range of motion and neck supple.   Skin:     General: Skin is warm and dry.      Capillary Refill: Capillary refill takes less than 2 seconds.   Neurological:      Mental Status: She is alert and oriented to person, place, and time.      Sensory: Sensation is intact.      Motor: Motor function is intact.      Coordination: Coordination is intact.      Gait: Gait is intact.   Psychiatric:         Attention and Perception: Attention and perception normal.         Mood and Affect: Mood is anxious. Affect is flat.         Speech: Speech is rapid and pressured.         Behavior: Behavior normal. Behavior is cooperative.         Thought Content: Thought content normal.         Cognition and Memory: " Cognition normal.        Result Review :  The following data was reviewed by: KAYLAH Delgado on 11/03/2022:    Influenza A POC - negative (-)  Influenza B POC - negative (-)  SARS COVID Ag POC - negative (-)    Assessment and Plan   Diagnoses and all orders for this visit:    1. Vaginal yeast infection (Primary)  -     fluconazole (Diflucan) 150 MG tablet; Take 1 tablet by mouth 1 (One) Time for 1 dose.  Dispense: 2 tablet; Refill: 0  -     miconazole (MICOTIN) 2 % vaginal cream; Insert 1 applicator into the vagina Every Night.  Dispense: 45 g; Refill: 0    2. Parasite infection  -     Pyrantel Pamoate 144 (50 Base) MG/ML suspension; Take 12.5 mL by mouth 1 (One) Time for 1 dose.  Dispense: 30 mL; Refill: 0    3. Cough, unspecified type  Comments:  Symptomatic/Supportive Care; OTC zyrtec and cough suppressant         I spent 15 minutes caring for Chanda on this date of service. This time includes time spent by me in the following activities:preparing for the visit, reviewing tests, obtaining and/or reviewing a separately obtained history, performing a medically appropriate examination and/or evaluation , counseling and educating the patient/family/caregiver, ordering medications, tests, or procedures, documenting information in the medical record and independently interpreting results and communicating that information with the patient/family/caregiver     Follow Up   Return if symptoms worsen or fail to improve.  Patient was given instructions and counseling regarding her condition or for health maintenance advice. Please see specific information pulled into the AVS if appropriate.       This document has been electronically signed by KAYLAH Delgado  November 4, 2022 08:32 EDT

## 2022-12-05 ENCOUNTER — OFFICE VISIT (OUTPATIENT)
Dept: FAMILY MEDICINE CLINIC | Facility: CLINIC | Age: 22
End: 2022-12-05

## 2022-12-05 VITALS
SYSTOLIC BLOOD PRESSURE: 101 MMHG | BODY MASS INDEX: 22.26 KG/M2 | TEMPERATURE: 97.6 F | OXYGEN SATURATION: 98 % | RESPIRATION RATE: 18 BRPM | DIASTOLIC BLOOD PRESSURE: 62 MMHG | HEART RATE: 88 BPM | HEIGHT: 62 IN | WEIGHT: 121 LBS

## 2022-12-05 DIAGNOSIS — R52 BODY ACHES: ICD-10-CM

## 2022-12-05 DIAGNOSIS — R11.0 NAUSEA: ICD-10-CM

## 2022-12-05 DIAGNOSIS — L25.9 CONTACT DERMATITIS, UNSPECIFIED CONTACT DERMATITIS TYPE, UNSPECIFIED TRIGGER: Primary | ICD-10-CM

## 2022-12-05 LAB
EXPIRATION DATE: NORMAL
EXPIRATION DATE: NORMAL
FLUAV AG UPPER RESP QL IA.RAPID: NOT DETECTED
FLUBV AG UPPER RESP QL IA.RAPID: NOT DETECTED
INTERNAL CONTROL: NORMAL
INTERNAL CONTROL: NORMAL
Lab: NORMAL
Lab: NORMAL
S PYO AG THROAT QL: NEGATIVE
SARS-COV-2 AG UPPER RESP QL IA.RAPID: NOT DETECTED

## 2022-12-05 PROCEDURE — 96372 THER/PROPH/DIAG INJ SC/IM: CPT | Performed by: NURSE PRACTITIONER

## 2022-12-05 PROCEDURE — 87428 SARSCOV & INF VIR A&B AG IA: CPT | Performed by: NURSE PRACTITIONER

## 2022-12-05 PROCEDURE — 99213 OFFICE O/P EST LOW 20 MIN: CPT | Performed by: NURSE PRACTITIONER

## 2022-12-05 PROCEDURE — 87880 STREP A ASSAY W/OPTIC: CPT | Performed by: NURSE PRACTITIONER

## 2022-12-05 RX ORDER — DEXAMETHASONE SODIUM PHOSPHATE 4 MG/ML
4 INJECTION, SOLUTION INTRA-ARTICULAR; INTRALESIONAL; INTRAMUSCULAR; INTRAVENOUS; SOFT TISSUE ONCE
Status: COMPLETED | OUTPATIENT
Start: 2022-12-05 | End: 2022-12-05

## 2022-12-05 RX ORDER — METHYLPREDNISOLONE 4 MG/1
TABLET ORAL
Qty: 21 EACH | Refills: 0 | Status: SHIPPED | OUTPATIENT
Start: 2022-12-05 | End: 2023-02-22

## 2022-12-05 RX ORDER — CETIRIZINE HYDROCHLORIDE 10 MG/1
10 TABLET ORAL DAILY
Qty: 30 TABLET | Refills: 2 | Status: SHIPPED | OUTPATIENT
Start: 2022-12-05 | End: 2023-02-22 | Stop reason: SDUPTHER

## 2022-12-05 RX ADMIN — DEXAMETHASONE SODIUM PHOSPHATE 4 MG: 4 INJECTION, SOLUTION INTRA-ARTICULAR; INTRALESIONAL; INTRAMUSCULAR; INTRAVENOUS; SOFT TISSUE at 13:49

## 2022-12-05 NOTE — PROGRESS NOTES
"Chief Complaint  Rash (Started about a month ago.) and Generalized Body Aches (Sore throat, headache, fatigue, Stomach ache X 4 days./)    Subjective        Chanda Eden presents to [facility] for acute care (rash, myalgias, nausea).    Rash  This is a new problem. Episode onset: 4 days ago. The problem has been gradually worsening since onset. The rash is diffuse (back, bilateral arms, bilateral hands). The rash is characterized by itchiness and redness. Associated with: new shampoo. Associated symptoms include congestion, coughing, fatigue and rhinorrhea. Pertinent negatives include no anorexia, diarrhea, eye pain, facial edema, fever, joint pain, nail changes, shortness of breath, sore throat or vomiting. Treatments tried: lotion. The treatment provided no relief. Her past medical history is significant for allergies.   Nausea  This is a new problem. Episode onset: 3-4 days ago. The problem occurs intermittently. The problem has been gradually worsening. Associated symptoms include a change in bowel habit, congestion, coughing, fatigue, headaches, myalgias, nausea and a rash. Pertinent negatives include no abdominal pain, anorexia, arthralgias, chest pain, chills, diaphoresis, fever, joint swelling, neck pain, numbness, sore throat, swollen glands, urinary symptoms, vertigo, visual change, vomiting or weakness. Associated symptoms comments: constipation. Exacerbated by: URI? She has tried NSAIDs (ibuprofen for the headache) for the symptoms. The treatment provided mild relief.     Objective   Vital Signs:  /62   Pulse 88   Temp 97.6 °F (36.4 °C) (Temporal)   Resp 18   Ht 157.5 cm (62\")   Wt 54.9 kg (121 lb)   SpO2 98%   BMI 22.13 kg/m²   Estimated body mass index is 22.13 kg/m² as calculated from the following:    Height as of this encounter: 157.5 cm (62\").    Weight as of this encounter: 54.9 kg (121 lb).    BMI is within normal parameters. No other follow-up for " BMI required.    Physical Exam  Vitals and nursing note reviewed.   Constitutional:       General: She is awake.      Appearance: Normal appearance.   HENT:      Head: Normocephalic.      Right Ear: Hearing, tympanic membrane, ear canal and external ear normal. There is impacted cerumen.      Left Ear: Hearing, tympanic membrane, ear canal and external ear normal. There is impacted cerumen.      Nose: Nasal tenderness and congestion present.      Mouth/Throat:      Lips: Pink.      Mouth: Mucous membranes are moist.      Pharynx: Oropharynx is clear.   Eyes:      General: Lids are normal.      Conjunctiva/sclera: Conjunctivae normal.      Pupils: Pupils are equal, round, and reactive to light.   Cardiovascular:      Rate and Rhythm: Normal rate and regular rhythm.      Heart sounds: Normal heart sounds.   Pulmonary:      Effort: Pulmonary effort is normal.      Breath sounds: Normal breath sounds.   Abdominal:      General: Abdomen is protuberant. Bowel sounds are normal.      Palpations: Abdomen is soft.      Tenderness: There is no abdominal tenderness.   Musculoskeletal:         General: Normal range of motion.      Cervical back: Normal range of motion and neck supple.   Lymphadenopathy:      Cervical: No cervical adenopathy.   Skin:     General: Skin is warm and dry.      Capillary Refill: Capillary refill takes less than 2 seconds.      Findings: Rash present. Rash is macular and papular.      Comments: Diffuse macular/papular rash noted to subha arms, subha hands, and back. Pt states it itches.  Minimal erythema and no drainage noted.   Neurological:      Mental Status: She is alert and oriented to person, place, and time.      Sensory: Sensation is intact.      Motor: Motor function is intact.      Coordination: Coordination is intact.      Gait: Gait is intact.   Psychiatric:         Attention and Perception: Attention and perception normal.         Mood and Affect: Mood is anxious. Affect is flat.          Speech: Speech normal.         Behavior: Behavior is slowed. Behavior is cooperative.         Thought Content: Thought content normal.        Result Review :  The following data was reviewed by: KAYLAH Delgado on 12/05/2022:  Strep    [facility] 12/5/22   POC Strep A, Molecular Negative           SARS FLU A / B Point of Care test.    Influenza A - Negative (-)  Influenza B - Negative (-)  SARS COVID Ag - Negative (-)     Assessment and Plan   Diagnoses and all orders for this visit:    1. Contact dermatitis, unspecified contact dermatitis type, unspecified trigger (Primary)  -     cetirizine (zyrTEC) 10 MG tablet; Take 1 tablet by mouth Daily.  Dispense: 30 tablet; Refill: 2  -     methylPREDNISolone (MEDROL) 4 MG dose pack; Take as directed on package instructions.  Dispense: 21 each; Refill: 0  -     dexamethasone (DECADRON) injection 4 mg    2. Nausea  Comments:  Symptomatic/Supportive Care; Rest; increase fluids; OTC medications for chronic constipation    3. Body aches  -     POCT SARS-CoV-2 Antigen LYLY + Flu  -     POC Rapid Strep A         I spent 15 minutes caring for Chanda on this date of service. This time includes time spent by me in the following activities:preparing for the visit, reviewing tests, obtaining and/or reviewing a separately obtained history, performing a medically appropriate examination and/or evaluation , counseling and educating the patient/family/caregiver, ordering medications, tests, or procedures, documenting information in the medical record and independently interpreting results and communicating that information with the patient/family/caregiver     Follow Up   Return if symptoms worsen or fail to improve.  Patient was given instructions and counseling regarding her condition or for health maintenance advice. Please see specific information pulled into the AVS if appropriate.       This document has been electronically signed by KAYLAH Delgado  December 5, 2022 14:32  EST

## 2022-12-29 ENCOUNTER — TELEPHONE (OUTPATIENT)
Dept: FAMILY MEDICINE CLINIC | Facility: CLINIC | Age: 22
End: 2022-12-29

## 2022-12-29 NOTE — TELEPHONE ENCOUNTER
Immunization records faxed      This document has been electronically signed by KAYLAH Delgado  December 29, 2022 12:51 EST

## 2022-12-29 NOTE — TELEPHONE ENCOUNTER
Caller: Chanda Eden    Relationship: Self    Best call back number: [phone]    What form or medical record are you requesting:  IMMUNIZATION RECORDS    Who is requesting this form or medical record from you: JAZZMINE TREVIZO    How would you like to receive the form or medical records (pick-up, mail, fax): FAX  If fax, what is the fax number:   ATTN: CINDY VARGAS    Timeframe paperwork needed: ASAP    Additional notes:  PATIENT NEEDS HER IMMUNIZATION RECORDS FAXED OVER TO [employer]      PATIENT WILL CALL BACK TO PROVIDE FAX NUMBER

## 2023-02-22 ENCOUNTER — OFFICE VISIT (OUTPATIENT)
Dept: FAMILY MEDICINE CLINIC | Facility: CLINIC | Age: 23
End: 2023-02-22
Payer: COMMERCIAL

## 2023-02-22 VITALS
HEIGHT: 62 IN | RESPIRATION RATE: 18 BRPM | OXYGEN SATURATION: 98 % | WEIGHT: 128 LBS | TEMPERATURE: 98.2 F | DIASTOLIC BLOOD PRESSURE: 80 MMHG | HEART RATE: 82 BPM | SYSTOLIC BLOOD PRESSURE: 117 MMHG | BODY MASS INDEX: 23.55 KG/M2

## 2023-02-22 DIAGNOSIS — L25.9 CONTACT DERMATITIS, UNSPECIFIED CONTACT DERMATITIS TYPE, UNSPECIFIED TRIGGER: ICD-10-CM

## 2023-02-22 DIAGNOSIS — R10.13 EPIGASTRIC PAIN: Primary | ICD-10-CM

## 2023-02-22 PROCEDURE — 83013 H PYLORI (C-13) BREATH: CPT | Performed by: NURSE PRACTITIONER

## 2023-02-22 PROCEDURE — 99213 OFFICE O/P EST LOW 20 MIN: CPT | Performed by: NURSE PRACTITIONER

## 2023-02-22 RX ORDER — CETIRIZINE HYDROCHLORIDE 10 MG/1
10 TABLET ORAL DAILY
Qty: 90 TABLET | Refills: 1 | Status: SHIPPED | OUTPATIENT
Start: 2023-02-22

## 2023-02-22 RX ORDER — OMEPRAZOLE 20 MG/1
20 CAPSULE, DELAYED RELEASE ORAL DAILY
Qty: 90 CAPSULE | Refills: 1 | Status: SHIPPED | OUTPATIENT
Start: 2023-02-22

## 2023-02-22 NOTE — PROGRESS NOTES
"Chief Complaint  Abdominal Pain (Pt also states she has rash on her left arm, stomach, and back.)    Subjective        Chanda Eden presents to [facility] for acute care (abdominal pain).    Abdominal Pain  This is a recurrent problem. The current episode started more than 1 month ago. The onset quality is gradual. The problem occurs intermittently. The problem has been waxing and waning since onset. The pain is located in the epigastric region. The pain is mild. The quality of the pain is described as burning and dull. The pain radiates to the epigastric region. Associated symptoms include anxiety, diarrhea and nausea. Pertinent negatives include no anorexia, arthralgias, belching, constipation, dysuria, fever, flatus, frequency, headaches, hematochezia, hematuria, melena, myalgias, rash, sore throat or vomiting. Nothing relieves the symptoms. Past treatments include nothing.     Objective   Vital Signs:  /80 (BP Location: Right arm, Patient Position: Sitting, Cuff Size: Adult)   Pulse 82   Temp 98.2 °F (36.8 °C) (Temporal)   Resp 18   Ht 157.5 cm (62\")   Wt 58.1 kg (128 lb)   SpO2 98%   BMI 23.41 kg/m²   Estimated body mass index is 23.41 kg/m² as calculated from the following:    Height as of this encounter: 157.5 cm (62\").    Weight as of this encounter: 58.1 kg (128 lb).       BMI is within normal parameters. No other follow-up for BMI required.    Physical Exam  Vitals and nursing note reviewed.   Constitutional:       General: She is awake.      Appearance: Normal appearance.   HENT:      Head: Normocephalic.      Right Ear: Hearing and external ear normal.      Left Ear: Hearing and external ear normal.      Nose: Nose normal.      Mouth/Throat:      Lips: Pink.      Mouth: Mucous membranes are moist.   Eyes:      General: Lids are normal.      Conjunctiva/sclera: Conjunctivae normal.      Pupils: Pupils are equal, round, and reactive to light.   Cardiovascular: "      Rate and Rhythm: Normal rate and regular rhythm.      Heart sounds: Normal heart sounds.   Pulmonary:      Effort: Pulmonary effort is normal.      Breath sounds: Normal breath sounds.   Abdominal:      General: Abdomen is protuberant. Bowel sounds are normal.      Palpations: Abdomen is soft.      Tenderness: There is abdominal tenderness in the epigastric area.   Musculoskeletal:         General: Normal range of motion.      Cervical back: Normal range of motion and neck supple.   Skin:     General: Skin is warm and dry.      Capillary Refill: Capillary refill takes less than 2 seconds.   Neurological:      Mental Status: She is alert and oriented to person, place, and time.      Sensory: Sensation is intact.      Motor: Motor function is intact.      Coordination: Coordination is intact.      Gait: Gait is intact.   Psychiatric:         Attention and Perception: Attention and perception normal.         Mood and Affect: Mood is anxious. Affect is flat.         Speech: Speech normal.         Behavior: Behavior normal. Behavior is cooperative.         Thought Content: Thought content normal.         Cognition and Memory: Cognition normal.         Judgment: Judgment normal.        Result Review :  The following data was reviewed by: KAYLAH Delgado on 02/22/2023:    Assessment and Plan   Diagnoses and all orders for this visit:    1. Epigastric pain (Primary)  -     Cancel: POCT H. pylori antibodies  -     omeprazole (priLOSEC) 20 MG capsule; Take 1 capsule by mouth Daily.  Dispense: 90 capsule; Refill: 1  -     H. Pylori Breath Test - Breath, Lung    2. Contact dermatitis, unspecified contact dermatitis type, unspecified trigger  -     cetirizine (zyrTEC) 10 MG tablet; Take 1 tablet by mouth Daily.  Dispense: 90 tablet; Refill: 1         I spent 15 minutes caring for Chanda on this date of service. This time includes time spent by me in the following activities:preparing for the visit, reviewing tests,  obtaining and/or reviewing a separately obtained history, performing a medically appropriate examination and/or evaluation , counseling and educating the patient/family/caregiver, ordering medications, tests, or procedures, referring and communicating with other health care professionals , documenting information in the medical record, independently interpreting results and communicating that information with the patient/family/caregiver and care coordination     Follow Up   Return if symptoms worsen or fail to improve.  Patient was given instructions and counseling regarding her condition or for health maintenance advice. Please see specific information pulled into the AVS if appropriate.       This document has been electronically signed by KAYLAH Delgado  February 22, 2023 15:33 EST

## 2023-02-23 LAB — UREA BREATH TEST QL: NEGATIVE

## 2023-02-24 ENCOUNTER — OFFICE VISIT (OUTPATIENT)
Dept: FAMILY MEDICINE CLINIC | Facility: CLINIC | Age: 23
End: 2023-02-24
Payer: COMMERCIAL

## 2023-02-24 VITALS
TEMPERATURE: 97.1 F | HEART RATE: 97 BPM | BODY MASS INDEX: 23.55 KG/M2 | DIASTOLIC BLOOD PRESSURE: 70 MMHG | SYSTOLIC BLOOD PRESSURE: 118 MMHG | OXYGEN SATURATION: 100 % | RESPIRATION RATE: 18 BRPM | HEIGHT: 62 IN | WEIGHT: 128 LBS

## 2023-02-24 DIAGNOSIS — Z00.00 ANNUAL PHYSICAL EXAM: Primary | ICD-10-CM

## 2023-02-24 DIAGNOSIS — E55.9 VITAMIN D DEFICIENCY: ICD-10-CM

## 2023-02-24 DIAGNOSIS — R10.13 EPIGASTRIC PAIN: ICD-10-CM

## 2023-02-24 DIAGNOSIS — Z00.00 ANNUAL PHYSICAL EXAM: ICD-10-CM

## 2023-02-24 LAB
ALBUMIN SERPL-MCNC: 4.5 G/DL (ref 3.5–5.2)
ALBUMIN/GLOB SERPL: 1.5 G/DL
ALP SERPL-CCNC: 45 U/L (ref 39–117)
ALT SERPL W P-5'-P-CCNC: 6 U/L (ref 1–33)
ANION GAP SERPL CALCULATED.3IONS-SCNC: 12.9 MMOL/L (ref 5–15)
AST SERPL-CCNC: 15 U/L (ref 1–32)
BASOPHILS # BLD AUTO: 0.03 10*3/MM3 (ref 0–0.2)
BASOPHILS NFR BLD AUTO: 0.7 % (ref 0–1.5)
BILIRUB SERPL-MCNC: 0.6 MG/DL (ref 0–1.2)
BUN SERPL-MCNC: 12 MG/DL (ref 6–20)
BUN/CREAT SERPL: 17.9 (ref 7–25)
CALCIUM SPEC-SCNC: 10.1 MG/DL (ref 8.6–10.5)
CHLORIDE SERPL-SCNC: 107 MMOL/L (ref 98–107)
CHOLEST SERPL-MCNC: 169 MG/DL (ref 0–200)
CO2 SERPL-SCNC: 22.1 MMOL/L (ref 22–29)
CREAT SERPL-MCNC: 0.67 MG/DL (ref 0.57–1)
DEPRECATED RDW RBC AUTO: 46.4 FL (ref 37–54)
EGFRCR SERPLBLD CKD-EPI 2021: 127.7 ML/MIN/1.73
EOSINOPHIL # BLD AUTO: 0.05 10*3/MM3 (ref 0–0.4)
EOSINOPHIL NFR BLD AUTO: 1.1 % (ref 0.3–6.2)
ERYTHROCYTE [DISTWIDTH] IN BLOOD BY AUTOMATED COUNT: 12.9 % (ref 12.3–15.4)
GLOBULIN UR ELPH-MCNC: 3 GM/DL
GLUCOSE SERPL-MCNC: 83 MG/DL (ref 65–99)
HCT VFR BLD AUTO: 38.6 % (ref 34–46.6)
HDLC SERPL-MCNC: 60 MG/DL (ref 40–60)
HGB BLD-MCNC: 12.6 G/DL (ref 12–15.9)
IMM GRANULOCYTES # BLD AUTO: 0.01 10*3/MM3 (ref 0–0.05)
IMM GRANULOCYTES NFR BLD AUTO: 0.2 % (ref 0–0.5)
LDLC SERPL CALC-MCNC: 96 MG/DL (ref 0–100)
LDLC/HDLC SERPL: 1.58 {RATIO}
LYMPHOCYTES # BLD AUTO: 2.21 10*3/MM3 (ref 0.7–3.1)
LYMPHOCYTES NFR BLD AUTO: 48.5 % (ref 19.6–45.3)
MCH RBC QN AUTO: 32 PG (ref 26.6–33)
MCHC RBC AUTO-ENTMCNC: 32.6 G/DL (ref 31.5–35.7)
MCV RBC AUTO: 98 FL (ref 79–97)
MONOCYTES # BLD AUTO: 0.39 10*3/MM3 (ref 0.1–0.9)
MONOCYTES NFR BLD AUTO: 8.6 % (ref 5–12)
NEUTROPHILS NFR BLD AUTO: 1.87 10*3/MM3 (ref 1.7–7)
NEUTROPHILS NFR BLD AUTO: 40.9 % (ref 42.7–76)
NRBC BLD AUTO-RTO: 0 /100 WBC (ref 0–0.2)
PLATELET # BLD AUTO: 206 10*3/MM3 (ref 140–450)
PMV BLD AUTO: 11.2 FL (ref 6–12)
POTASSIUM SERPL-SCNC: 4.3 MMOL/L (ref 3.5–5.2)
PROT SERPL-MCNC: 7.5 G/DL (ref 6–8.5)
RBC # BLD AUTO: 3.94 10*6/MM3 (ref 3.77–5.28)
SODIUM SERPL-SCNC: 142 MMOL/L (ref 136–145)
TRIGL SERPL-MCNC: 70 MG/DL (ref 0–150)
TSH SERPL DL<=0.05 MIU/L-ACNC: 0.98 UIU/ML (ref 0.27–4.2)
VLDLC SERPL-MCNC: 13 MG/DL (ref 5–40)
WBC NRBC COR # BLD: 4.56 10*3/MM3 (ref 3.4–10.8)

## 2023-02-24 PROCEDURE — 82306 VITAMIN D 25 HYDROXY: CPT | Performed by: NURSE PRACTITIONER

## 2023-02-24 PROCEDURE — 80050 GENERAL HEALTH PANEL: CPT | Performed by: NURSE PRACTITIONER

## 2023-02-24 PROCEDURE — 99395 PREV VISIT EST AGE 18-39: CPT | Performed by: NURSE PRACTITIONER

## 2023-02-24 PROCEDURE — 3008F BODY MASS INDEX DOCD: CPT | Performed by: NURSE PRACTITIONER

## 2023-02-24 PROCEDURE — 80061 LIPID PANEL: CPT | Performed by: NURSE PRACTITIONER

## 2023-02-24 PROCEDURE — 2014F MENTAL STATUS ASSESS: CPT | Performed by: NURSE PRACTITIONER

## 2023-02-25 LAB — 25(OH)D3 SERPL-MCNC: 24.7 NG/ML (ref 30–100)

## 2023-02-25 NOTE — PATIENT INSTRUCTIONS
Gastroesophageal Reflux Disease, Adult  Gastroesophageal reflux (DAYLIN) happens when acid from the stomach flows up into the tube that connects the mouth and the stomach (esophagus). Normally, food travels down the esophagus and stays in the stomach to be digested. With DAYLIN, food and stomach acid sometimes move back up into the esophagus.  You may have a disease called gastroesophageal reflux disease (GERD) if the reflux:  Happens often.  Causes frequent or very bad symptoms.  Causes problems such as damage to the esophagus.  When this happens, the esophagus becomes sore and swollen. Over time, GERD can make small holes (ulcers) in the lining of the esophagus.  What are the causes?  This condition is caused by a problem with the muscle between the esophagus and the stomach. When this muscle is weak or not normal, it does not close properly to keep food and acid from coming back up from the stomach.  The muscle can be weak because of:  Tobacco use.  Pregnancy.  Having a certain type of hernia (hiatal hernia).  Alcohol use.  Certain foods and drinks, such as coffee, chocolate, onions, and peppermint.  What increases the risk?  Being overweight.  Having a disease that affects your connective tissue.  Taking NSAIDs, such a ibuprofen.  What are the signs or symptoms?  Heartburn.  Difficult or painful swallowing.  The feeling of having a lump in the throat.  A bitter taste in the mouth.  Bad breath.  Having a lot of saliva.  Having an upset or bloated stomach.  Burping.  Chest pain. Different conditions can cause chest pain. Make sure you see your doctor if you have chest pain.  Shortness of breath or wheezing.  A long-term cough or a cough at night.  Wearing away of the surface of teeth (tooth enamel).  Weight loss.  How is this treated?  Making changes to your diet.  Taking medicine.  Having surgery.  Treatment will depend on how bad your symptoms are.  Follow these instructions at home:  Eating and drinking    Follow a  diet as told by your doctor. You may need to avoid foods and drinks such as:  Coffee and tea, with or without caffeine.  Drinks that contain alcohol.  Energy drinks and sports drinks.  Bubbly (carbonated) drinks or sodas.  Chocolate and cocoa.  Peppermint and mint flavorings.  Garlic and onions.  Horseradish.  Spicy and acidic foods. These include peppers, chili powder, cutler powder, vinegar, hot sauces, and BBQ sauce.  Citrus fruit juices and citrus fruits, such as oranges, nathanael, and limes.  Tomato-based foods. These include red sauce, chili, salsa, and pizza with red sauce.  Fried and fatty foods. These include donuts, french fries, potato chips, and high-fat dressings.  High-fat meats. These include hot dogs, rib eye steak, sausage, ham, and kay.  High-fat dairy items, such as whole milk, butter, and cream cheese.  Eat small meals often. Avoid eating large meals.  Avoid drinking large amounts of liquid with your meals.  Avoid eating meals during the 2-3 hours before bedtime.  Avoid lying down right after you eat.  Do not exercise right after you eat.  Lifestyle    Do not smoke or use any products that contain nicotine or tobacco. If you need help quitting, ask your doctor.  Try to lower your stress. If you need help doing this, ask your doctor.  If you are overweight, lose an amount of weight that is healthy for you. Ask your doctor about a safe weight loss goal.  General instructions  Pay attention to any changes in your symptoms.  Take over-the-counter and prescription medicines only as told by your doctor.  Do not take aspirin, ibuprofen, or other NSAIDs unless your doctor says it is okay.  Wear loose clothes. Do not wear anything tight around your waist.  Raise (elevate) the head of your bed about 6 inches (15 cm). You may need to use a wedge to do this.  Avoid bending over if this makes your symptoms worse.  Keep all follow-up visits.  Contact a doctor if:  You have new symptoms.  You lose weight and you  do not know why.  You have trouble swallowing or it hurts to swallow.  You have wheezing or a cough that keeps happening.  You have a hoarse voice.  Your symptoms do not get better with treatment.  Get help right away if:  You have sudden pain in your arms, neck, jaw, teeth, or back.  You suddenly feel sweaty, dizzy, or light-headed.  You have chest pain or shortness of breath.  You vomit and the vomit is green, yellow, or black, or it looks like blood or coffee grounds.  You faint.  Your poop (stool) is red, bloody, or black.  You cannot swallow, drink, or eat.  These symptoms may represent a serious problem that is an emergency. Do not wait to see if the symptoms will go away. Get medical help right away. Call your local emergency services (911 in the [place] Do not drive yourself to the hospital.  Summary  If a person has gastroesophageal reflux disease (GERD), food and stomach acid move back up into the esophagus and cause symptoms or problems such as damage to the esophagus.  Treatment will depend on how bad your symptoms are.  Follow a diet as told by your doctor.  Take all medicines only as told by your doctor.  This information is not intended to replace advice given to you by your health care provider. Make sure you discuss any questions you have with your health care provider.  Document Revised: 06/28/2021 Document Reviewed: 06/28/2021  [employer] Patient Education © 2022 [employer]

## 2023-02-28 RX ORDER — ERGOCALCIFEROL 1.25 MG/1
50000 CAPSULE ORAL WEEKLY
Qty: 8 CAPSULE | Refills: 0 | Status: SHIPPED | OUTPATIENT
Start: 2023-02-28

## 2023-03-13 ENCOUNTER — OFFICE VISIT (OUTPATIENT)
Dept: CARDIOLOGY | Facility: CLINIC | Age: 23
End: 2023-03-13
Payer: COMMERCIAL

## 2023-03-13 VITALS
OXYGEN SATURATION: 98 % | WEIGHT: 123.4 LBS | HEIGHT: 62 IN | DIASTOLIC BLOOD PRESSURE: 73 MMHG | BODY MASS INDEX: 22.71 KG/M2 | HEART RATE: 93 BPM | SYSTOLIC BLOOD PRESSURE: 107 MMHG

## 2023-03-13 DIAGNOSIS — R00.2 PALPITATIONS: ICD-10-CM

## 2023-03-13 DIAGNOSIS — R60.0 BILATERAL LOWER EXTREMITY EDEMA: ICD-10-CM

## 2023-03-13 DIAGNOSIS — F15.91 HISTORY OF METHAMPHETAMINE USE: Primary | ICD-10-CM

## 2023-03-13 PROCEDURE — 1159F MED LIST DOCD IN RCRD: CPT | Performed by: NURSE PRACTITIONER

## 2023-03-13 PROCEDURE — 99214 OFFICE O/P EST MOD 30 MIN: CPT | Performed by: NURSE PRACTITIONER

## 2023-03-13 PROCEDURE — 1160F RVW MEDS BY RX/DR IN RCRD: CPT | Performed by: NURSE PRACTITIONER

## 2023-03-13 PROCEDURE — 93000 ELECTROCARDIOGRAM COMPLETE: CPT | Performed by: NURSE PRACTITIONER

## 2023-03-13 NOTE — PROGRESS NOTES
Subjective     Chanda Eden is a 22 y.o. female who presents to day for Edema (In legs has improved does get some swelling in ankles ) and Palpitations (Have resolved patient states this was she was active in addiction.).    CHIEF COMPLIANT  Chief Complaint   Patient presents with   • Edema     In legs has improved does get some swelling in ankles    • Palpitations     Have resolved patient states this was she was active in addiction.       Active Problems:  Problem List Items Addressed This Visit    None  Visit Diagnoses     History of methamphetamine use    -  Primary    Palpitations        Bilateral lower extremity edema              HPI  Palpitations   Pertinent negatives include no chest pain, dizziness, fever, nausea, shortness of breath or weakness.     Ms. Chanda Royal states she was using methamphetamine at the time, when her lower extremity edema began in 03/2022. She stopped using methamphetamine in 10/2022 or 12/2022. This year, her edema has been intermittent. She states her ankles are swollen, but her legs no longer swell. She denies any color changes or numbness. She is no longer experiencing palpitations.  This also resolved after stopping the use of methamphetamines.    Her fatigue has improved. She believes it is related to her addiction and recovery.    Her primary care physician is KAYLAH Franco.     PRIOR MEDS  Current Outpatient Medications on File Prior to Visit   Medication Sig Dispense Refill   • cetirizine (zyrTEC) 10 MG tablet Take 1 tablet by mouth Daily. 90 tablet 1   • estradiol cypionate (DEPO-ESTRADIOL) 5 MG/ML injection Inject  into the appropriate muscle as directed by prescriber Every 28 (Twenty-Eight) Days.     • omeprazole (priLOSEC) 20 MG capsule Take 1 capsule by mouth Daily. 90 capsule 1   • vitamin D (ERGOCALCIFEROL) 1.25 MG (47010 UT) capsule capsule Take 1 capsule by mouth 1 (One) Time Per Week. 8 capsule 0     No current facility-administered medications on file  "prior to visit.       ALLERGIES  Patient has no known allergies.    HISTORY  Past Medical History:   Diagnosis Date   • Anxiety    • Depression    • PTSD (post-traumatic stress disorder)        Social History     Socioeconomic History   • Marital status: Single   Tobacco Use   • Smoking status: Never   • Smokeless tobacco: Never   Vaping Use   • Vaping Use: Every day   • Substances: Nicotine, Flavoring   • Devices: Disposable, Pre-filled or refillable cartridge, Pre-filled pod   Substance and Sexual Activity   • Alcohol use: Never   • Drug use: Not Currently     Types: Marijuana, Amphetamines     Comment: Relapsed - Sober since 10/31/2022   • Sexual activity: Defer       Family History   Problem Relation Age of Onset   • Depression Mother    • Anxiety disorder Mother    • Schizophrenia Mother    • Depression Father    • Anxiety disorder Father    • Schizophrenia Father        Review of Systems   Constitutional: Positive for fatigue. Negative for chills and fever.   HENT: Positive for congestion (in am resolves later in day) and rhinorrhea. Negative for sore throat.    Respiratory: Negative for chest tightness and shortness of breath.    Cardiovascular: Positive for palpitations and leg swelling (ankles get puffy ). Negative for chest pain.   Gastrointestinal: Positive for constipation and diarrhea. Negative for nausea.   Musculoskeletal: Negative for arthralgias, back pain and neck pain.   Allergic/Immunologic: Positive for environmental allergies. Negative for food allergies.   Neurological: Negative for dizziness, syncope, weakness and light-headedness.   Hematological: Bruises/bleeds easily (bruise).   Psychiatric/Behavioral: Negative for sleep disturbance.       Objective     VITALS: /73 (BP Location: Left arm, Patient Position: Sitting, Cuff Size: Adult)   Pulse 93   Ht 157.4 cm (61.97\")   Wt 56 kg (123 lb 6.4 oz)   SpO2 98%   BMI 22.59 kg/m²     LABS:   Lab Results (most recent)     None    "       IMAGING:   No Images in the past 120 days found..    EXAM:  Physical Exam  Vitals and nursing note reviewed.   Constitutional:       Appearance: She is well-developed.   HENT:      Head: Normocephalic.   Eyes:      Pupils: Pupils are equal, round, and reactive to light.   Neck:      Thyroid: No thyroid mass.      Vascular: No carotid bruit or JVD.      Trachea: Trachea and phonation normal.   Cardiovascular:      Rate and Rhythm: Normal rate. Rhythm irregular.      Pulses:           Radial pulses are 2+ on the right side and 2+ on the left side.        Posterior tibial pulses are 2+ on the right side and 2+ on the left side.      Heart sounds: Normal heart sounds. No murmur heard.    No friction rub. No gallop.   Pulmonary:      Effort: Pulmonary effort is normal. No respiratory distress.      Breath sounds: Normal breath sounds. No wheezing or rales.   Abdominal:      General: Bowel sounds are normal.      Palpations: Abdomen is soft.   Musculoskeletal:         General: No swelling. Normal range of motion.      Cervical back: Neck supple.   Skin:     General: Skin is warm and dry.      Capillary Refill: Capillary refill takes less than 2 seconds.      Findings: No rash.   Neurological:      Mental Status: She is alert and oriented to person, place, and time.   Psychiatric:         Speech: Speech normal.         Behavior: Behavior normal.         Thought Content: Thought content normal.         Judgment: Judgment normal.         Procedure     ECG 12 Lead    Date/Time: 3/13/2023 2:14 PM  Performed by: Parker Murdock APRN  Authorized by: Parker Murdock APRN   Comparison: compared with previous ECG from 8/26/2022  Similar to previous ECG  Comparison to previous ECG: Other than right  Rhythm: sinus arrhythmia  Rate: normal  BPM: 72  QRS axis: right  Comments: QTc 416 ms  No acute changes               Assessment & Plan    Diagnosis Plan   1. History of methamphetamine use        2. Palpitations        3.  Bilateral lower extremity edema        Cardiac health maintenance  1. The patient's electrocardiogram results were discussed in full detail with the patient.  All questions were answered.  2. An order has been placed for a transthoracic echocardiogram for evaluations of patient's symptoms that have resolved.  Also will help evaluate for her history of methamphetamine use..  3.  Informed of signs and symptoms of ACS and advised to seek emergent treatment for any new worsening symptoms.  Patient also advised sooner follow-up as needed.  Also advised to follow-up with family doctor as needed  This note is dictated utilizing voice recognition software.  Although this record has been proof read, transcriptional errors may still be present. If questions occur regarding the content of this record please do not hesitate to call our office.  I have reviewed and confirmed the accuracy of the ROS as documented by the MA/LPN/RN KAYLAH Clark    Return in about 3 months (around 6/13/2023), or if symptoms worsen or fail to improve.    Diagnoses and all orders for this visit:    1. History of methamphetamine use (Primary)    2. Palpitations    3. Bilateral lower extremity edema    Other orders  -     Cancel: ECG 12 Lead             Assessment  1. Edema  2. Palpitations      Chanda Eden  reports that she has never smoked. She has never used smokeless tobacco.. I have educated her on the risk of diseases from using tobacco products.        MEDS ORDERED DURING VISIT:  No orders of the defined types were placed in this encounter.          This document has been electronically signed by KAYLAH Clark Jr.  March 14, 2023 09:22 EDT       Transcribed from ambient dictation for KAYLAH Clark by Sole Vicente.  03/13/23   18:56 EDT    Patient or patient representative verbalized consent to the visit recording.  I have personally performed the services described in this document as transcribed by the above  individual, and it is both accurate and complete.

## 2023-03-24 ENCOUNTER — OFFICE VISIT (OUTPATIENT)
Dept: FAMILY MEDICINE CLINIC | Facility: CLINIC | Age: 23
End: 2023-03-24
Payer: COMMERCIAL

## 2023-03-24 VITALS
TEMPERATURE: 97.6 F | BODY MASS INDEX: 22.45 KG/M2 | WEIGHT: 122 LBS | HEIGHT: 62 IN | SYSTOLIC BLOOD PRESSURE: 111 MMHG | RESPIRATION RATE: 18 BRPM | OXYGEN SATURATION: 99 % | DIASTOLIC BLOOD PRESSURE: 82 MMHG | HEART RATE: 91 BPM

## 2023-03-24 DIAGNOSIS — Z72.51 HIGH RISK HETEROSEXUAL BEHAVIOR: ICD-10-CM

## 2023-03-24 DIAGNOSIS — R21 RASH: Primary | ICD-10-CM

## 2023-03-24 DIAGNOSIS — A74.9 CHLAMYDIA: Primary | ICD-10-CM

## 2023-03-24 LAB
C TRACH RRNA CVX QL NAA+PROBE: DETECTED
N GONORRHOEA RRNA SPEC QL NAA+PROBE: NOT DETECTED
TRICHOMONAS VAGINALIS PCR: NOT DETECTED

## 2023-03-24 PROCEDURE — 87491 CHLMYD TRACH DNA AMP PROBE: CPT | Performed by: NURSE PRACTITIONER

## 2023-03-24 PROCEDURE — 87591 N.GONORRHOEAE DNA AMP PROB: CPT | Performed by: NURSE PRACTITIONER

## 2023-03-24 PROCEDURE — 87661 TRICHOMONAS VAGINALIS AMPLIF: CPT | Performed by: NURSE PRACTITIONER

## 2023-03-24 RX ORDER — CLOTRIMAZOLE 1 %
1 CREAM (GRAM) TOPICAL 2 TIMES DAILY
Qty: 60 G | Refills: 0 | Status: SHIPPED | OUTPATIENT
Start: 2023-03-24

## 2023-03-24 RX ORDER — DEXAMETHASONE SODIUM PHOSPHATE 4 MG/ML
8 INJECTION, SOLUTION INTRA-ARTICULAR; INTRALESIONAL; INTRAMUSCULAR; INTRAVENOUS; SOFT TISSUE ONCE
Status: COMPLETED | OUTPATIENT
Start: 2023-03-24 | End: 2023-03-24

## 2023-03-24 RX ADMIN — DEXAMETHASONE SODIUM PHOSPHATE 8 MG: 4 INJECTION, SOLUTION INTRA-ARTICULAR; INTRALESIONAL; INTRAMUSCULAR; INTRAVENOUS; SOFT TISSUE at 09:38

## 2023-03-24 NOTE — PROGRESS NOTES
"Chief Complaint  Rash (On bilateral lower extremity /)    Subjective        Chanda Eden presents to [facility] for acute care (rash BLE; chlamydia testing).    Rash  This is a recurrent problem. Episode onset: 2 months or longer. The problem has been waxing and waning since onset. Location: bilateral lower extremities. The rash is characterized by itchiness, redness and dryness. She was exposed to nothing. Pertinent negatives include no anorexia, congestion, cough, diarrhea, eye pain, facial edema, fatigue, fever, joint pain, nail changes, rhinorrhea, shortness of breath, sore throat or vomiting. Treatments tried: zyrtec; OTC antibiotic ointment; oral steroid. The treatment provided mild relief. Her past medical history is significant for allergies.     Objective   Vital Signs:  /82   Pulse 91   Temp 97.6 °F (36.4 °C) (Temporal)   Resp 18   Ht 157.5 cm (62\")   Wt 55.3 kg (122 lb)   SpO2 99%   BMI 22.31 kg/m²   Estimated body mass index is 22.31 kg/m² as calculated from the following:    Height as of this encounter: 157.5 cm (62\").    Weight as of this encounter: 55.3 kg (122 lb).       BMI is within normal parameters. No other follow-up for BMI required.    Physical Exam  Vitals and nursing note reviewed.   Constitutional:       General: She is awake.      Appearance: Normal appearance.   HENT:      Head: Normocephalic.      Right Ear: Hearing and external ear normal.      Left Ear: Hearing and external ear normal.      Nose: Nose normal.      Mouth/Throat:      Lips: Pink.      Mouth: Mucous membranes are moist.   Eyes:      General: Lids are normal.      Conjunctiva/sclera: Conjunctivae normal.      Pupils: Pupils are equal, round, and reactive to light.   Cardiovascular:      Rate and Rhythm: Normal rate.   Pulmonary:      Effort: Pulmonary effort is normal.   Abdominal:      General: Abdomen is protuberant.   Musculoskeletal:         General: Normal range of " motion.      Cervical back: Normal range of motion.   Skin:     General: Skin is warm and dry.      Capillary Refill: Capillary refill takes less than 2 seconds.      Findings: Rash present.      Comments: Dry/itchy macular/papular rash noted to subha anterior lower legs and bilateral posterior upper legs   Neurological:      Mental Status: She is alert and oriented to person, place, and time.      Sensory: Sensation is intact.      Motor: Motor function is intact.      Coordination: Coordination is intact.      Gait: Gait is intact.   Psychiatric:         Attention and Perception: Attention and perception normal.         Mood and Affect: Mood is anxious. Affect is flat.         Speech: Speech normal.         Behavior: Behavior normal. Behavior is cooperative.         Thought Content: Thought content normal.          Result Review :  The following data was reviewed by: KAYLAH Delgado on 03/24/2023:    Assessment and Plan   Diagnoses and all orders for this visit:    1. Rash (Primary)  -     Ambulatory Referral to Dermatology  -     dexamethasone (DECADRON) injection 8 mg  -     mupirocin (BACTROBAN) 2 % ointment; Apply 1 application topically to the appropriate area as directed 3 (Three) Times a Day.  Dispense: 1 each; Refill: 0  -     clotrimazole (LOTRIMIN) 1 % cream; Apply 1 application topically to the appropriate area as directed 2 (Two) Times a Day.  Dispense: 60 g; Refill: 0    2. High risk heterosexual behavior  -     Chlamydia trachomatis, Neisseria gonorrhoeae, Trichomonas vaginalis, PCR - Urine, Vagina; Future         I spent 15 minutes caring for Chanda on this date of service. This time includes time spent by me in the following activities:preparing for the visit, reviewing tests, obtaining and/or reviewing a separately obtained history, performing a medically appropriate examination and/or evaluation , counseling and educating the patient/family/caregiver, ordering medications, tests, or procedures,  referring and communicating with other health care professionals , documenting information in the medical record, independently interpreting results and communicating that information with the patient/family/caregiver and care coordination     Follow Up   Return if symptoms worsen or fail to improve.  Patient was given instructions and counseling regarding her condition or for health maintenance advice. Please see specific information pulled into the AVS if appropriate.       This document has been electronically signed by KAYLAH Delgado  March 24, 2023 09:40 EDT

## 2023-03-27 RX ORDER — DOXYCYCLINE HYCLATE 100 MG/1
100 CAPSULE ORAL 2 TIMES DAILY
Qty: 14 CAPSULE | Refills: 0 | Status: SHIPPED | OUTPATIENT
Start: 2023-03-27

## 2023-03-27 NOTE — PROGRESS NOTES
Results discussed with pt. Doxycycline 100 mg by mouth 2 x daily for 7 days prescribed. Pt advised to stay absent from sex for 7 days after completion of 7 day antibiotic regimen.  Pt also advised for new sexual partner to be tested and treated.  Pt verbalizes understanding.

## 2023-04-10 ENCOUNTER — TELEPHONE (OUTPATIENT)
Dept: FAMILY MEDICINE CLINIC | Facility: CLINIC | Age: 23
End: 2023-04-10
Payer: COMMERCIAL

## 2023-08-24 ENCOUNTER — TELEPHONE (OUTPATIENT)
Dept: FAMILY MEDICINE CLINIC | Facility: CLINIC | Age: 23
End: 2023-08-24
Payer: COMMERCIAL

## 2023-08-24 NOTE — TELEPHONE ENCOUNTER
Attempted to contact patient to reschedule previous missed appointment on 8/24/23    HUB okay to reschedule appointment at patients earliest convenience.    Laurel Hunt, SandorSched Rep

## 2023-09-20 DIAGNOSIS — L25.9 CONTACT DERMATITIS, UNSPECIFIED CONTACT DERMATITIS TYPE, UNSPECIFIED TRIGGER: ICD-10-CM

## 2023-09-20 DIAGNOSIS — A74.9 CHLAMYDIA: ICD-10-CM

## 2023-09-20 DIAGNOSIS — E55.9 VITAMIN D DEFICIENCY: ICD-10-CM

## 2023-09-20 DIAGNOSIS — R21 RASH: ICD-10-CM

## 2023-09-20 RX ORDER — DOXYCYCLINE HYCLATE 100 MG/1
100 CAPSULE ORAL 2 TIMES DAILY
Qty: 14 CAPSULE | Refills: 0 | OUTPATIENT
Start: 2023-09-20

## 2023-09-20 RX ORDER — ERGOCALCIFEROL 1.25 MG/1
50000 CAPSULE ORAL WEEKLY
Qty: 8 CAPSULE | Refills: 0 | OUTPATIENT
Start: 2023-09-20

## 2023-09-20 RX ORDER — CETIRIZINE HYDROCHLORIDE 10 MG/1
10 TABLET ORAL DAILY
Qty: 90 TABLET | Refills: 1 | OUTPATIENT
Start: 2023-09-20

## 2023-09-20 RX ORDER — CLOTRIMAZOLE 1 %
1 CREAM (GRAM) TOPICAL 2 TIMES DAILY
Qty: 60 G | Refills: 0 | OUTPATIENT
Start: 2023-09-20

## 2023-09-20 NOTE — TELEPHONE ENCOUNTER
Caller: Chanda Eden    Relationship: Self    Best call back number: [phone]     Who are you requesting to speak with (clinical staff, provider,  specific staff member): CLINICAL    What was the call regarding: CHECKING STATUS ON HER REFILLS    Is it okay if the provider responds through MyChart: YES

## 2024-06-10 ENCOUNTER — TELEPHONE (OUTPATIENT)
Dept: CARDIOLOGY | Facility: CLINIC | Age: 24
End: 2024-06-10

## 2024-06-10 NOTE — TELEPHONE ENCOUNTER
Caller: Chanda Eden    Relationship to patient: Self    Best call back number: [phone]    Chief complaint: CHEST TIGHTNESS, SOB, STOMACH PAIN, LEG NUMBNESS ON THE LEFT SIDE.     Type of visit: FOLLOW UP    Requested date: ASAP     Additional notes: NO RECENT ED VISITS.

## 2024-06-10 NOTE — TELEPHONE ENCOUNTER
Called and spoke with patient and advised to go to ER for further evaluation. She states she does not have a ride, advised I could call EMS for her. She does not want to go to ER at this time. Patient wants a sooner MOLLY to be seen. Advised would send to scheduling, we do recommend ER in the meantime. She verbally understands.    Amalia Mc MA

## 2024-06-11 ENCOUNTER — TELEPHONE (OUTPATIENT)
Dept: CARDIOLOGY | Facility: CLINIC | Age: 24
End: 2024-06-11

## 2024-06-11 NOTE — TELEPHONE ENCOUNTER
Caller: Chanda Eden    Relationship to patient: Self    Best call back number: [phone]    Type of visit: FOLLOW UP    Requested date: ASAP    If rescheduling, when is the original appointment: 6.11.24    Additional notes:PT HAD AN APPOINTMENT FOR TODAY THAT THEY WERE NOT AWARE OF. WANTS IN ASAP. NO AVAILABILITY UNTIL OCTOBER,2,2024.PT SAYS SHE CAN'T WAIT THAT LONG. PLEASE CALL BACK PT TO DISCUSS AVAILABILITY.

## 2024-06-19 ENCOUNTER — OFFICE VISIT (OUTPATIENT)
Dept: CARDIOLOGY | Facility: CLINIC | Age: 24
End: 2024-06-19
Payer: COMMERCIAL

## 2024-06-19 VITALS
WEIGHT: 111 LBS | BODY MASS INDEX: 20.43 KG/M2 | HEIGHT: 62 IN | RESPIRATION RATE: 18 BRPM | DIASTOLIC BLOOD PRESSURE: 77 MMHG | OXYGEN SATURATION: 97 % | HEART RATE: 101 BPM | SYSTOLIC BLOOD PRESSURE: 118 MMHG

## 2024-06-19 DIAGNOSIS — R00.2 PALPITATIONS: ICD-10-CM

## 2024-06-19 DIAGNOSIS — R94.31 ABNORMAL EKG: ICD-10-CM

## 2024-06-19 DIAGNOSIS — R07.9 CHEST PAIN, UNSPECIFIED TYPE: Primary | ICD-10-CM

## 2024-06-19 DIAGNOSIS — I51.7 MILD RIGHT ATRIAL ENLARGEMENT: ICD-10-CM

## 2024-06-19 NOTE — PROGRESS NOTES
Problem list     Subjective   Chanda Eden is a 23 y.o. female     Chief Complaint   Patient presents with    Chest Pain     A couple times a month    Numbness     Left leg numbness, been going on for over two years comes and goes. Does have slight swelling when it goes numb but not a lot       Shortness of Breath     A couple times a month        HPI    Patient is a 23-year-old female presenting to the office to be evaluated.  Patient has no history of coronary disease or structural heart disease to my knowledge.    Patient has been having issues of feeling as if her leg is swelling at times.  This appears to be unilateral on the left lower extremity.  She apparently had numbness in the lower extremity as well.    She occasionally has chest tightness but does not describe any other type of pain or pressure.  No shortness of breath.  She will feel her heart palpitate occasionally.  But feels that this is due to stress.  No complaints of PND orthopnea.    She apparently had an EKG concerning for atrial enlargement.  She is here for evaluation.      Current Outpatient Medications on File Prior to Visit   Medication Sig Dispense Refill    cetirizine (zyrTEC) 10 MG tablet Take 1 tablet by mouth Daily. (Patient not taking: Reported on 6/19/2024) 90 tablet 1    clotrimazole (LOTRIMIN) 1 % cream Apply 1 application topically to the appropriate area as directed 2 (Two) Times a Day. (Patient not taking: Reported on 6/19/2024) 60 g 0    doxycycline (VIBRAMYCIN) 100 MG capsule Take 1 capsule by mouth 2 (Two) Times a Day. (Patient not taking: Reported on 6/19/2024) 14 capsule 0    estradiol cypionate (DEPO-ESTRADIOL) 5 MG/ML injection Inject  into the appropriate muscle as directed by prescriber Every 28 (Twenty-Eight) Days. (Patient not taking: Reported on 6/19/2024)      mupirocin (BACTROBAN) 2 % ointment Apply 1 application topically to the appropriate area as directed 3 (Three) Times a Day. (Patient not taking: Reported  "on 6/19/2024) 1 each 0    omeprazole (priLOSEC) 20 MG capsule Take 1 capsule by mouth Daily. (Patient not taking: Reported on 6/19/2024) 90 capsule 1    vitamin D (ERGOCALCIFEROL) 1.25 MG (25399 UT) capsule capsule Take 1 capsule by mouth 1 (One) Time Per Week. (Patient not taking: Reported on 6/19/2024) 8 capsule 0     No current facility-administered medications on file prior to visit.       Patient has no known allergies.    Past Medical History:   Diagnosis Date    Anxiety     Depression     PTSD (post-traumatic stress disorder)        Social History     Socioeconomic History    Marital status: Single   Tobacco Use    Smoking status: Former     Types: Cigarettes    Smokeless tobacco: Never   Vaping Use    Vaping status: Every Day    Substances: Nicotine, Flavoring    Devices: Disposable, Pre-filled or refillable cartridge, Pre-filled pod   Substance and Sexual Activity    Alcohol use: Never    Drug use: Not Currently     Types: Marijuana, Amphetamines     Comment: Sober Since: 02/04/2023    Sexual activity: Defer       Family History   Problem Relation Age of Onset    Depression Mother     Anxiety disorder Mother     Schizophrenia Mother     Depression Father     Anxiety disorder Father     Schizophrenia Father        Review of Systems   Constitutional:  Negative for fatigue.   Respiratory:  Positive for shortness of breath.    Cardiovascular:  Positive for chest pain and palpitations. Negative for leg swelling.   Neurological:  Negative for syncope.       Objective   Vitals:    06/19/24 1543   BP: 118/77   Pulse: 101   Resp: 18   SpO2: 97%   Weight: 50.3 kg (111 lb)   Height: 157.5 cm (62.01\")      /77   Pulse 101   Resp 18   Ht 157.5 cm (62.01\")   Wt 50.3 kg (111 lb)   SpO2 97%   BMI 20.30 kg/m²     Lab Results (most recent)       None            Physical Exam  Vitals and nursing note reviewed.   Constitutional:       General: She is not in acute distress.     Appearance: Normal appearance. She is " well-developed.   HENT:      Head: Normocephalic and atraumatic.   Eyes:      General: No scleral icterus.        Right eye: No discharge.         Left eye: No discharge.      Conjunctiva/sclera: Conjunctivae normal.   Neck:      Vascular: No carotid bruit.   Cardiovascular:      Rate and Rhythm: Normal rate and regular rhythm.      Heart sounds: Normal heart sounds. No murmur heard.     No friction rub. No gallop.   Pulmonary:      Effort: Pulmonary effort is normal. No respiratory distress.      Breath sounds: Normal breath sounds. No wheezing or rales.   Chest:      Chest wall: No tenderness.   Musculoskeletal:      Right lower leg: No edema.      Left lower leg: No edema.   Skin:     General: Skin is warm and dry.      Coloration: Skin is not pale.      Findings: No erythema or rash.   Neurological:      Mental Status: She is alert and oriented to person, place, and time.      Cranial Nerves: No cranial nerve deficit.   Psychiatric:         Behavior: Behavior normal.         Procedure     ECG 12 Lead    Date/Time: 6/19/2024 4:06 PM  Performed by: Bryson Watkins PA    Authorized by: Bryson Watkins PA  Comparison: not compared with previous ECG   Comments: Demonstrate sinus rhythm at 97 bpm with borderline right atrial enlargement and no acute ST changes             Assessment & Plan     Problems Addressed this Visit    None  Visit Diagnoses       Chest pain, unspecified type    -  Primary    Relevant Orders    Adult Transthoracic Echo Complete W/ Cont if Necessary Per Protocol    Palpitations        Relevant Orders    Adult Transthoracic Echo Complete W/ Cont if Necessary Per Protocol    Abnormal EKG        Relevant Orders    Adult Transthoracic Echo Complete W/ Cont if Necessary Per Protocol    Mild right atrial enlargement        Relevant Orders    Adult Transthoracic Echo Complete W/ Cont if Necessary Per Protocol          Diagnoses         Codes Comments    Chest pain, unspecified type    -  Primary  ICD-10-CM: R07.9  ICD-9-CM: 786.50     Palpitations     ICD-10-CM: R00.2  ICD-9-CM: 785.1     Abnormal EKG     ICD-10-CM: R94.31  ICD-9-CM: 794.31     Mild right atrial enlargement     ICD-10-CM: I51.7  ICD-9-CM: 429.3           Recommendations  1.  Patient is a 23-year-old female presenting to the office to be evaluated.  She has atypical chest pain and occasional palpitations that she feels is due to stress or induced by stress.  She does not describe any ischemic symptoms.  EKG suggest possible right atrial enlargement.  Will schedule echo to evaluate cardiac structure and function.    2.  Otherwise, in the absence of any worsening symptoms, if echocardiogram is normal, nothing further.    3.  She has no edema on exam today.  I am not sure the etiology of her numbness of the left lower extremity but could be coming from her lower back.  For now, we will start with echocardiogram and see her back for follow-up.  Palpitations are minimal.  She has no symptoms to suggest malignant arrhythmia.  We will see her back for follow-up as discussed.  Follow-up with primary as scheduled.                   BMI is within normal parameters. No other follow-up for BMI required.       Electronically signed by:

## 2024-08-12 ENCOUNTER — HOSPITAL ENCOUNTER (OUTPATIENT)
Dept: CARDIOLOGY | Facility: HOSPITAL | Age: 24
Discharge: HOME | End: 2024-08-12
Admitting: PHYSICIAN ASSISTANT
Payer: COMMERCIAL

## 2024-08-12 DIAGNOSIS — R07.9 CHEST PAIN, UNSPECIFIED TYPE: ICD-10-CM

## 2024-08-12 DIAGNOSIS — R94.31 ABNORMAL EKG: ICD-10-CM

## 2024-08-12 DIAGNOSIS — I51.7 MILD RIGHT ATRIAL ENLARGEMENT: ICD-10-CM

## 2024-08-12 DIAGNOSIS — R00.2 PALPITATIONS: ICD-10-CM

## 2024-08-12 LAB
AORTIC DIMENSIONLESS INDEX: 0.9 (DI)
BH CV ECHO MEAS - ACS: 1.69 CM
BH CV ECHO MEAS - AO MAX PG: 3 MMHG
BH CV ECHO MEAS - AO MEAN PG: 1.67 MMHG
BH CV ECHO MEAS - AO ROOT DIAM: 2.7 CM
BH CV ECHO MEAS - AO V2 MAX: 86.9 CM/SEC
BH CV ECHO MEAS - AO V2 VTI: 17.4 CM
BH CV ECHO MEAS - AVA(I,D): 2.9 CM2
BH CV ECHO MEAS - EDV(CUBED): 54 ML
BH CV ECHO MEAS - EDV(MOD-SP4): 34.3 ML
BH CV ECHO MEAS - EF(MOD-SP4): 58.3 %
BH CV ECHO MEAS - ESV(CUBED): 15.9 ML
BH CV ECHO MEAS - ESV(MOD-SP4): 14.3 ML
BH CV ECHO MEAS - FS: 33.4 %
BH CV ECHO MEAS - IVS/LVPW: 0.8 CM
BH CV ECHO MEAS - IVSD: 0.66 CM
BH CV ECHO MEAS - LA A2CS (ATRIAL LENGTH): 4 CM
BH CV ECHO MEAS - LA A4C LENGTH: 2.9 CM
BH CV ECHO MEAS - LA DIMENSION: 2.5 CM
BH CV ECHO MEAS - LAT PEAK E' VEL: 18.6 CM/SEC
BH CV ECHO MEAS - LV DIASTOLIC VOL/BSA (35-75): 23 CM2
BH CV ECHO MEAS - LV MASS(C)D: 77.2 GRAMS
BH CV ECHO MEAS - LV MAX PG: 3.2 MMHG
BH CV ECHO MEAS - LV MEAN PG: 1.91 MMHG
BH CV ECHO MEAS - LV SYSTOLIC VOL/BSA (12-30): 9.6 CM2
BH CV ECHO MEAS - LV V1 MAX: 89.7 CM/SEC
BH CV ECHO MEAS - LV V1 VTI: 15.7 CM
BH CV ECHO MEAS - LVIDD: 3.8 CM
BH CV ECHO MEAS - LVIDS: 2.5 CM
BH CV ECHO MEAS - LVOT AREA: 3.2 CM2
BH CV ECHO MEAS - LVOT DIAM: 2.01 CM
BH CV ECHO MEAS - LVPWD: 0.83 CM
BH CV ECHO MEAS - MED PEAK E' VEL: 14.7 CM/SEC
BH CV ECHO MEAS - MV A MAX VEL: 79.3 CM/SEC
BH CV ECHO MEAS - MV DEC TIME: 0.11 SEC
BH CV ECHO MEAS - MV E MAX VEL: 111 CM/SEC
BH CV ECHO MEAS - MV E/A: 1.4
BH CV ECHO MEAS - PA V2 MAX: 87.9 CM/SEC
BH CV ECHO MEAS - RV MAX PG: 1.33 MMHG
BH CV ECHO MEAS - RV V1 MAX: 57.6 CM/SEC
BH CV ECHO MEAS - RV V1 VTI: 12.5 CM
BH CV ECHO MEAS - RVDD: 2.17 CM
BH CV ECHO MEAS - SV(LVOT): 49.9 ML
BH CV ECHO MEAS - SV(MOD-SP4): 20 ML
BH CV ECHO MEAS - SVI(LVOT): 33.5 ML/M2
BH CV ECHO MEAS - SVI(MOD-SP4): 13.4 ML/M2
BH CV ECHO MEASUREMENTS AVERAGE E/E' RATIO: 6.67
LEFT ATRIUM VOLUME: 14 ML

## 2024-08-12 PROCEDURE — 93306 TTE W/DOPPLER COMPLETE: CPT

## 2024-08-21 ENCOUNTER — TELEPHONE (OUTPATIENT)
Dept: CARDIOLOGY | Facility: CLINIC | Age: 24
End: 2024-08-21
Payer: COMMERCIAL

## 2024-08-21 NOTE — TELEPHONE ENCOUNTER
Caller: Rolly Eden    Relationship: Self    Best call back number: [phone]    Caller requesting test results: ROLLY EDEN    What test was performed: ECHO    When was the test performed: 8-12-24    [facility]    Additional notes: PATIENT WOULD LIKE FOR KAYLAH BETHEA TO REACH OUT WHEN RESULTS FOR THE ECHO ARE AVAILABLE. PLEASE REACH OUT

## 2024-08-21 NOTE — TELEPHONE ENCOUNTER
Relay...  Results not available at this time. Patient to be called when recommendations have been received.

## 2024-08-26 ENCOUNTER — TELEPHONE (OUTPATIENT)
Dept: CARDIOLOGY | Facility: CLINIC | Age: 24
End: 2024-08-26
Payer: COMMERCIAL

## 2024-08-26 NOTE — TELEPHONE ENCOUNTER
ECHO  Pt notified of no acute findings. Provider will discuss results at f/u. Pt reminded of appt date and time.  ----- Message from Bryson Watkins sent at 8/26/2024  1:23 PM EDT -----  Routine follow up